# Patient Record
Sex: MALE | Race: WHITE | NOT HISPANIC OR LATINO | Employment: FULL TIME | URBAN - METROPOLITAN AREA
[De-identification: names, ages, dates, MRNs, and addresses within clinical notes are randomized per-mention and may not be internally consistent; named-entity substitution may affect disease eponyms.]

---

## 2017-03-07 ENCOUNTER — HOSPITAL ENCOUNTER (EMERGENCY)
Facility: HOSPITAL | Age: 22
Discharge: HOME | End: 2017-03-07
Attending: EMERGENCY MEDICINE | Admitting: EMERGENCY MEDICINE
Payer: COMMERCIAL

## 2017-03-07 ENCOUNTER — APPOINTMENT (EMERGENCY)
Dept: RADIOLOGY | Facility: HOSPITAL | Age: 22
End: 2017-03-07
Payer: COMMERCIAL

## 2017-03-07 VITALS
DIASTOLIC BLOOD PRESSURE: 60 MMHG | RESPIRATION RATE: 16 BRPM | WEIGHT: 135 LBS | TEMPERATURE: 98 F | SYSTOLIC BLOOD PRESSURE: 130 MMHG | OXYGEN SATURATION: 98 % | HEART RATE: 73 BPM

## 2017-03-07 DIAGNOSIS — S83.90XA KNEE SPRAIN: Primary | ICD-10-CM

## 2017-03-07 PROCEDURE — 73564 X-RAY EXAM KNEE 4 OR MORE: CPT

## 2017-03-07 PROCEDURE — 99283 EMERGENCY DEPT VISIT LOW MDM: CPT

## 2017-03-07 RX ORDER — NAPROXEN 500 MG/1
500 TABLET ORAL 3 TIMES DAILY PRN
Qty: 15 TABLET | Refills: 0 | Status: SHIPPED | OUTPATIENT
Start: 2017-03-07 | End: 2019-07-25

## 2017-03-07 RX ORDER — IBUPROFEN 600 MG/1
600 TABLET ORAL ONCE
Status: COMPLETED | OUTPATIENT
Start: 2017-03-07 | End: 2017-03-07

## 2017-03-07 RX ADMIN — IBUPROFEN 600 MG: 600 TABLET ORAL at 11:44

## 2019-07-22 ENCOUNTER — APPOINTMENT (EMERGENCY)
Dept: RADIOLOGY | Facility: HOSPITAL | Age: 24
End: 2019-07-22
Payer: COMMERCIAL

## 2019-07-22 ENCOUNTER — HOSPITAL ENCOUNTER (EMERGENCY)
Facility: HOSPITAL | Age: 24
Discharge: HOME | End: 2019-07-22
Attending: EMERGENCY MEDICINE | Admitting: EMERGENCY MEDICINE
Payer: COMMERCIAL

## 2019-07-22 VITALS
TEMPERATURE: 99 F | SYSTOLIC BLOOD PRESSURE: 113 MMHG | OXYGEN SATURATION: 98 % | HEIGHT: 70 IN | HEART RATE: 66 BPM | BODY MASS INDEX: 19.33 KG/M2 | WEIGHT: 135 LBS | DIASTOLIC BLOOD PRESSURE: 74 MMHG | RESPIRATION RATE: 16 BRPM

## 2019-07-22 DIAGNOSIS — S67.20XA: Primary | ICD-10-CM

## 2019-07-22 PROCEDURE — 73130 X-RAY EXAM OF HAND: CPT

## 2019-07-22 PROCEDURE — 99283 EMERGENCY DEPT VISIT LOW MDM: CPT | Performed by: EMERGENCY MEDICINE

## 2019-07-22 PROCEDURE — 99283 EMERGENCY DEPT VISIT LOW MDM: CPT

## 2019-07-22 RX ORDER — PREDNISONE 20 MG/1
60 TABLET ORAL ONCE
Status: COMPLETED | OUTPATIENT
Start: 2019-07-22 | End: 2019-07-22

## 2019-07-22 RX ORDER — IBUPROFEN 600 MG/1
600 TABLET ORAL EVERY 6 HOURS PRN
Qty: 30 TABLET | Refills: 0 | Status: SHIPPED | OUTPATIENT
Start: 2019-07-22 | End: 2019-07-25

## 2019-07-22 RX ORDER — PREDNISONE 20 MG/1
60 TABLET ORAL DAILY
Qty: 15 TABLET | Refills: 0 | Status: SHIPPED | OUTPATIENT
Start: 2019-07-22 | End: 2019-07-27

## 2019-07-22 RX ADMIN — PREDNISONE 60 MG: 20 TABLET ORAL at 11:50
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
## 2019-07-25 VITALS
HEART RATE: 61 BPM | BODY MASS INDEX: 18.38 KG/M2 | DIASTOLIC BLOOD PRESSURE: 69 MMHG | SYSTOLIC BLOOD PRESSURE: 109 MMHG | WEIGHT: 128.4 LBS | HEIGHT: 70 IN

## 2019-07-25 DIAGNOSIS — S60.221A CONTUSION OF RIGHT HAND, INITIAL ENCOUNTER: Primary | ICD-10-CM

## 2019-07-25 PROCEDURE — 99203 OFFICE O/P NEW LOW 30 MIN: CPT | Performed by: ORTHOPAEDIC SURGERY

## 2019-07-25 NOTE — LETTER
July 25, 2019     Patient: Jt Farnsworth   YOB: 1995   Date of Visit: 7/25/2019       To Whom it May Concern:    Jt Farnsworth is under my professional care  He was seen in my office on 7/25/2019  He will be off of work until 7/28/2019  He may return to work at that time without restrictions  If you have any questions or concerns, please don't hesitate to call           Sincerely,          Mimi Resendiz MD        CC: No Recipients
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
## 2022-02-07 ENCOUNTER — APPOINTMENT (EMERGENCY)
Dept: CT IMAGING | Facility: HOSPITAL | Age: 27
End: 2022-02-07
Payer: OTHER MISCELLANEOUS

## 2022-02-07 ENCOUNTER — HOSPITAL ENCOUNTER (EMERGENCY)
Facility: HOSPITAL | Age: 27
Discharge: HOME | End: 2022-02-07
Attending: EMERGENCY MEDICINE | Admitting: EMERGENCY MEDICINE
Payer: OTHER MISCELLANEOUS

## 2022-02-07 ENCOUNTER — APPOINTMENT (EMERGENCY)
Dept: RADIOLOGY | Facility: HOSPITAL | Age: 27
End: 2022-02-07
Payer: OTHER MISCELLANEOUS

## 2022-02-07 ENCOUNTER — TELEPHONE (OUTPATIENT)
Dept: OBGYN CLINIC | Facility: HOSPITAL | Age: 27
End: 2022-02-07

## 2022-02-07 VITALS
RESPIRATION RATE: 18 BRPM | TEMPERATURE: 98 F | HEART RATE: 85 BPM | DIASTOLIC BLOOD PRESSURE: 76 MMHG | SYSTOLIC BLOOD PRESSURE: 127 MMHG | OXYGEN SATURATION: 99 %

## 2022-02-07 VITALS
RESPIRATION RATE: 18 BRPM | TEMPERATURE: 98.5 F | DIASTOLIC BLOOD PRESSURE: 84 MMHG | OXYGEN SATURATION: 99 % | SYSTOLIC BLOOD PRESSURE: 134 MMHG | HEART RATE: 72 BPM

## 2022-02-07 DIAGNOSIS — S69.91XA INJURY OF RIGHT WRIST, INITIAL ENCOUNTER: Primary | ICD-10-CM

## 2022-02-07 DIAGNOSIS — S62.143A CLOSED HAMATE FRACTURE: ICD-10-CM

## 2022-02-07 DIAGNOSIS — S62.308A CLOSED FRACTURE OF 5TH METACARPAL: Primary | ICD-10-CM

## 2022-02-07 PROCEDURE — 99284 EMERGENCY DEPT VISIT MOD MDM: CPT | Performed by: PHYSICIAN ASSISTANT

## 2022-02-07 PROCEDURE — G1004 CDSM NDSC: HCPCS

## 2022-02-07 PROCEDURE — 96372 THER/PROPH/DIAG INJ SC/IM: CPT

## 2022-02-07 PROCEDURE — 73110 X-RAY EXAM OF WRIST: CPT

## 2022-02-07 PROCEDURE — 99283 EMERGENCY DEPT VISIT LOW MDM: CPT

## 2022-02-07 PROCEDURE — 29125 APPL SHORT ARM SPLINT STATIC: CPT | Performed by: PHYSICIAN ASSISTANT

## 2022-02-07 PROCEDURE — 73200 CT UPPER EXTREMITY W/O DYE: CPT

## 2022-02-07 RX ORDER — KETOROLAC TROMETHAMINE 30 MG/ML
30 INJECTION, SOLUTION INTRAMUSCULAR; INTRAVENOUS ONCE
Status: COMPLETED | OUTPATIENT
Start: 2022-02-07 | End: 2022-02-07

## 2022-02-07 RX ADMIN — KETOROLAC TROMETHAMINE 30 MG: 30 INJECTION, SOLUTION INTRAMUSCULAR at 13:39
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
## 2022-02-07 NOTE — Clinical Note
Jacquie Maryellen was seen and treated in our emergency department on 2/7/2022  Diagnosis:     Andrew Vallecillo  may return to work on return date  He may return on this date: 02/09/2022         If you have any questions or concerns, please don't hesitate to call        Alyssa Guajardo PA-C    ______________________________           _______________          _______________  Hospital Representative                              Date                                Time

## 2022-02-07 NOTE — Clinical Note
Alexia Karyle Fry accompanied Karen Devika to the emergency department on 2/7/2022  Return date if applicable: 17/07/5480        If you have any questions or concerns, please don't hesitate to call        Juan Gonzales PA-C
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
## 2022-02-07 NOTE — TELEPHONE ENCOUNTER
Spoke to patient's girlfriend  She stated his symptoms are worsening from when he was seen in the ER  Stated he is having n/t to his hand now and can move but cannot feel his thumb  Advised for worsening symptoms he would need to be seen in the ER  We cannot advise further unless/until seen in our office  She is asking for the patient to be scheduled for an office visit for follow up  Please reach out for scheduling

## 2022-02-08 ENCOUNTER — OFFICE VISIT (OUTPATIENT)
Dept: OBGYN CLINIC | Facility: CLINIC | Age: 27
End: 2022-02-08
Payer: OTHER MISCELLANEOUS

## 2022-02-08 VITALS — BODY MASS INDEX: 18.32 KG/M2 | HEIGHT: 70 IN | WEIGHT: 128 LBS

## 2022-02-08 DIAGNOSIS — S62.346A CLOSED NONDISPLACED FRACTURE OF BASE OF FIFTH METACARPAL BONE OF RIGHT HAND, INITIAL ENCOUNTER: Primary | ICD-10-CM

## 2022-02-08 PROCEDURE — 99214 OFFICE O/P EST MOD 30 MIN: CPT | Performed by: PHYSICIAN ASSISTANT

## 2022-02-08 PROCEDURE — 29075 APPL CST ELBW FNGR SHORT ARM: CPT | Performed by: PHYSICIAN ASSISTANT

## 2022-02-08 RX ORDER — IBUPROFEN 600 MG/1
600 TABLET ORAL EVERY 6 HOURS PRN
Qty: 60 TABLET | Refills: 0 | Status: SHIPPED | OUTPATIENT
Start: 2022-02-08 | End: 2022-03-04 | Stop reason: HOSPADM

## 2022-02-08 RX ORDER — ACETAMINOPHEN 500 MG
500 TABLET ORAL EVERY 6 HOURS PRN
Qty: 60 TABLET | Refills: 0 | Status: SHIPPED | OUTPATIENT
Start: 2022-02-08 | End: 2022-03-04 | Stop reason: HOSPADM
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
## 2022-02-08 NOTE — LETTER
February 8, 2022     Patient: Anne Marie Mooney   YOB: 1995   Date of Visit: 2/8/2022       To Whom it May Concern:    Anne Marie Mooney is under my professional care  He was seen in my office on 2/8/2022  He will be on work restrictions with no use of the right upper extremity  He will follow up in 3 weeks for re-evaluation  If you have any questions or concerns, please don't hesitate to call  Sincerely,          Cheyanne Ramey PA-C        CC: No Recipients

## 2022-02-17 ENCOUNTER — TELEPHONE (OUTPATIENT)
Dept: OBGYN CLINIC | Facility: OTHER | Age: 27
End: 2022-02-17

## 2022-02-17 NOTE — TELEPHONE ENCOUNTER
Patient called in , he is having some increasing pain to his arm , he reports it as a throbbing pain thru his hand to bicep and also having issues "respinding:" he said when he tells his fingers to move, they do not move right away?     Patient requested to come in sooner than 03-01    I am not sure if I was allowed to schedule for Marleen Payne so I will task to the hand pool for further assistance     C/b # [phone]    Thank you !!!

## 2022-02-21 ENCOUNTER — HOSPITAL ENCOUNTER (OUTPATIENT)
Dept: RADIOLOGY | Facility: HOSPITAL | Age: 27
Discharge: HOME | End: 2022-02-21

## 2022-02-21 ENCOUNTER — OFFICE VISIT (OUTPATIENT)
Dept: OBGYN CLINIC | Facility: HOSPITAL | Age: 27
End: 2022-02-21
Payer: OTHER MISCELLANEOUS

## 2022-02-21 VITALS
HEIGHT: 70 IN | WEIGHT: 128 LBS | SYSTOLIC BLOOD PRESSURE: 120 MMHG | HEART RATE: 80 BPM | DIASTOLIC BLOOD PRESSURE: 80 MMHG | BODY MASS INDEX: 18.32 KG/M2

## 2022-02-21 DIAGNOSIS — R52 PAIN: ICD-10-CM

## 2022-02-21 DIAGNOSIS — S62.346A CLOSED NONDISPLACED FRACTURE OF BASE OF FIFTH METACARPAL BONE OF RIGHT HAND, INITIAL ENCOUNTER: ICD-10-CM

## 2022-02-21 DIAGNOSIS — R52 PAIN: Primary | ICD-10-CM

## 2022-02-21 PROCEDURE — 99213 OFFICE O/P EST LOW 20 MIN: CPT | Performed by: PHYSICIAN ASSISTANT

## 2022-02-21 PROCEDURE — 73080 X-RAY EXAM OF ELBOW: CPT

## 2022-02-21 RX ORDER — METHOCARBAMOL 500 MG/1
500 TABLET, FILM COATED ORAL 4 TIMES DAILY
Qty: 30 TABLET | Refills: 0 | Status: SHIPPED | OUTPATIENT
Start: 2022-02-21 | End: 2022-03-04 | Stop reason: HOSPADM

## 2022-02-21 NOTE — PROGRESS NOTES
CHIEF COMPLAINT:  Chief Complaint   Patient presents with    Right Hand - Follow-up       SUBJECTIVE:  Karen Fortune is a 27y o  year old male who presents for follow-up regarding nondisplaced fracture at the base of the 5th metacarpal   Patient was placed into an ulnar gutter cast   He states that he has pain just above the cast in the forearm  He also notes some pain into his elbow  He states he has decreased strength with range of motion of his digits  He states he will get occasional spurts of pain that are in his forearm and can become intense at times  PAST MEDICAL HISTORY:  Past Medical History:   Diagnosis Date    Hypospadias     as a baby       PAST SURGICAL HISTORY:  Past Surgical History:   Procedure Laterality Date    HERNIA REPAIR  1998    OTHER SURGICAL HISTORY      coils placed by urology     TONSILLECTOMY  1997       FAMILY HISTORY:  History reviewed  No pertinent family history      SOCIAL HISTORY:  Social History     Tobacco Use    Smoking status: Current Every Day Smoker     Packs/day: 1 00    Smokeless tobacco: Never Used   Vaping Use    Vaping Use: Never used   Substance Use Topics    Alcohol use: Yes     Comment: social    Drug use: No       MEDICATIONS:    Current Outpatient Medications:     acetaminophen (TYLENOL) 500 mg tablet, Take 1 tablet (500 mg total) by mouth every 6 (six) hours as needed for mild pain, Disp: 60 tablet, Rfl: 0    ibuprofen (MOTRIN) 600 mg tablet, Take 1 tablet (600 mg total) by mouth every 6 (six) hours as needed for mild pain, Disp: 60 tablet, Rfl: 0    methocarbamol (ROBAXIN) 500 mg tablet, Take 1 tablet (500 mg total) by mouth 4 (four) times a day, Disp: 30 tablet, Rfl: 0    ALLERGIES:  Allergies   Allergen Reactions    Iodinated Diagnostic Agents        REVIEW OF SYSTEMS:  Review of Systems  ROS:   General: no fever, no chills  HEENT:  No loss of hearing or eyesight problems  Eyes:  No red eyes  Respiratory:  No coughing, shortness of breath or wheezing  Cardiovascular:  No chest pain, no palpitations  GI:  Abdomen soft nontender, denies nausea  Endocrine:  No muscle weakness, no frequent urination, no excessive thirst  Urinary:  No dysuria, no incontinence  Musculoskeletal: see HPI and PE  SKIN:  No skin rash, no dry skin  Neurological:  No headaches, no confusion  Psychiatric:  No suicide thoughts, no anxiety, no depression  Review of all other systems is negative    VITALS:  Vitals:    02/21/22 1338   BP: 120/80   Pulse: 80       LABS:  HgA1c: No results found for: HGBA1C  BMP: No results found for: GLUCOSE, CALCIUM, NA, K, CO2, CL, BUN, CREATININE    _____________________________________________________  PHYSICAL EXAMINATION:  General: well developed and well nourished, alert, oriented times 3 and appears comfortable  Psychiatric: Normal  HEENT: Trachea Midline, No torticollis  Pulmonary: No audible wheezing or respiratory distress   Skin: No masses, erythema, lacerations, fluctation, ulcerations  Neurovascular: Sensation Intact to the Median, Ulnar, Radial Nerve, Motor Intact to the Median, Ulnar, Radial Nerve and Pulses Intact    MUSCULOSKELETAL EXAMINATION:  Right hand  Cast is in appropriate position without evidence of damage or soilage  He is able to actively move the thumb, index and long finger  Sensation is intact to light touch   Mild tenderness to palpation over the radial head  He has full range of motion of the elbow with flexion and extension but notes pain while doing so    ___________________________________________________  STUDIES REVIEWED:  Images obtained today of the Right elbow 3 views demonstrate No acute fractures or dislocations noted      PROCEDURES PERFORMED:  Procedures  No Procedures performed today    _____________________________________________________  ASSESSMENT/PLAN:    Closed nondisplaced fracture base of the 5th metacarpal right hand in cast  · Patient was advised that likely due to spasticity of the muscles he is experiencing pain and discomfort into the forearm and hand  · He was advised that he has decreased range of motion because of the splint particularly at the MCP joints  · He was given a prescription for methocarbamol as a muscle relaxer to help with any spasms of the forearm   · He will follow up in 2 weeks for re-evaluation with Dr Brenda Joseph  For the cast off and new x-rays of the right hand  · He was advised to call the office if he has having any issues before this time  Follow Up:  Return in about 2 weeks (around 3/7/2022) for Dr Brenda Joseph   To Do Next Visit:  Re-evaluation of current issue, cast off and x-rays of right hand          Portions of the record may have been created with voice recognition software  Occasional wrong word or "sound a like" substitutions may have occurred due to the inherent limitations of voice recognition software  Read the chart carefully and recognize, using context, where substitutions have occurred

## 2022-02-28 ENCOUNTER — HOSPITAL ENCOUNTER (EMERGENCY)
Facility: HOSPITAL | Age: 27
End: 2022-03-01
Attending: EMERGENCY MEDICINE

## 2022-02-28 DIAGNOSIS — S62.306D CLOSED NONDISPLACED FRACTURE OF FIFTH METACARPAL BONE OF RIGHT HAND WITH ROUTINE HEALING, UNSPECIFIED PORTION OF METACARPAL, SUBSEQUENT ENCOUNTER: ICD-10-CM

## 2022-02-28 DIAGNOSIS — Z00.8 MEDICAL CLEARANCE FOR PSYCHIATRIC ADMISSION: ICD-10-CM

## 2022-02-28 DIAGNOSIS — R45.851 SUICIDAL IDEATIONS: Primary | ICD-10-CM

## 2022-02-28 LAB
AMPHETAMINES SERPL QL SCN: NEGATIVE
BARBITURATES UR QL: NEGATIVE
BENZODIAZ UR QL: NEGATIVE
COCAINE UR QL: NEGATIVE
FLUAV RNA RESP QL NAA+PROBE: NEGATIVE
FLUBV RNA RESP QL NAA+PROBE: NEGATIVE
METHADONE UR QL: NEGATIVE
OPIATES UR QL SCN: NEGATIVE
OXYCODONE+OXYMORPHONE UR QL SCN: NEGATIVE
PCP UR QL: NEGATIVE
RSV RNA RESP QL NAA+PROBE: NEGATIVE
SARS-COV-2 RNA RESP QL NAA+PROBE: NEGATIVE
THC UR QL: NEGATIVE

## 2022-02-28 PROCEDURE — 99284 EMERGENCY DEPT VISIT MOD MDM: CPT | Performed by: EMERGENCY MEDICINE

## 2022-02-28 PROCEDURE — 99285 EMERGENCY DEPT VISIT HI MDM: CPT

## 2022-02-28 PROCEDURE — 0241U HB NFCT DS VIR RESP RNA 4 TRGT: CPT | Performed by: EMERGENCY MEDICINE

## 2022-02-28 PROCEDURE — 80307 DRUG TEST PRSMV CHEM ANLYZR: CPT | Performed by: EMERGENCY MEDICINE

## 2022-02-28 RX ORDER — NICOTINE 21 MG/24HR
21 PATCH, TRANSDERMAL 24 HOURS TRANSDERMAL ONCE
Status: DISCONTINUED | OUTPATIENT
Start: 2022-02-28 | End: 2022-03-02 | Stop reason: HOSPADM

## 2022-02-28 RX ORDER — NICOTINE 21 MG/24HR
PATCH, TRANSDERMAL 24 HOURS TRANSDERMAL
Status: DISCONTINUED
Start: 2022-02-28 | End: 2022-03-02 | Stop reason: HOSPADM

## 2022-02-28 RX ADMIN — IBUPROFEN 600 MG: 200 TABLET, FILM COATED ORAL at 20:39
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
## 2022-02-28 NOTE — LETTER
Section I - General Information    Name of Patient: Deuce Hager                 : 1995    Medicare #:____________________  Transport Date: 22 (PCS is valid for round trips on this date and for all repetitive trips in the 60-day range as noted below )  Origin: [address] [place] [address]                                                         Destination:[address]______________  Is the pt's stay covered under Medicare Part A (PPS/DRG)     (_) YES  (_) NO  Closest appropriate facility?  (_) YES  (_) NO  If no, why is transport to more distant facility required?________________________  If hosp-hosp transfer, describe services needed at 2nd facility not available at 1st facility? _________________________________  If hospice pt, is this transport related to pt's terminal illness? (_) YES (_) NO Describe____________________________________    Section II - Medical Necessity Questionnaire  Ambulance transportation is medically necessary only if other means of transport are contraindicated or would be potentially harmful to the patient  To meet this requirement, the patient must either be "bed confined" or suffer from a condition such that transport by means other than ambulance is contraindicated by the patient's condition   The following questions must be answered by the medical professional signing below for this form to be valid:    1)  Describe the MEDICAL CONDITION (physical and/or mental) of this patient AT [address] [place] [address] that requires the patient to be transported in an ambulance and why transport by other means is contraindicated by the patient's condition:__________________________________________________________________________________________________    2) Is the patient "bed confined" as defined below?     (_) YES  (_) NO  To be "be confined" the patient must satisfy all three of the following conditions: (1) unable to get up from bed without Assistance; AND (2) unable to ambulate; AND (3) unable to sit in a chair or wheelchair  3) Can this patient safely be transported by car or wheelchair Latrell Cam (i e , seated during transport without a medical attendant or monitoring)?   (_) YES  (_) NO    4) In addition to completing questions 1-3 above, please check any of the following conditions that apply*:  *Note: supporting documentation for any boxes checked must be maintained in the patient's medical records  (_)Contractures   (_)Non-Healed Fractures  (_)Patient is confused (_)Patient is comatose (_)Moderate/severe pain on movement (_)Danger to self/others  (_)IV meds/fluids required (_)Patient is combative(_)Need or possible need for restraints (_)DVT requires elevation of lower extremity  (_)Medical attendant required (_)Requires oxygen-unable to self administer (_)Special handling/isolation/infection control precautions required (_)Unable to tolerate seated position for time needed to transport (_)Hemodynamic monitoring required en route (_)Unable to sit in a chair or wheelchair due to decubitus ulcers or other wounds (_)Cardiac monitoring required en route (_)Morbid obesity requires additional personnel/equipment to safely handle patient (_)Orthopedic device (backboard, halo, pins, traction, brace, wedge, etc,) requiring special handling during transport (_)Other(specify)_______________________________________________    Section III - Signature of Physician or Healthcare Professional    I certify that the above information is accurate based on my evaluation of this patient, and that the medical necessity provisions of 42  40(e)(1) are met, requiring that this patient be transported by ambulance  I understand this information will be used by the Centers for Medicare and Medicaid Services (CMS) to support the determination of medical necessity for ambulance services   I represent that I am the beneficiarys attending physician; or an employee of the beneficiarys attending physician, or the hospital or facility where the beneficiary is being treated and from which the beneficiary is being transported; that I have personal knowledge of the beneficiarys condition at the time of transport; and that I meet all Medicare regulations and applicable State licensure laws for the credential indicated  (_) If this box is checked, I also certify that the patient is physically or mentally incapable of signing the ambulance service's claim and that the institution with which I am affiliated has furnished care, services, or assistance to the patient  My signature below is made on behalf of the patient pursuant to 42 CFR §424 36(b)(4)  In accordance with 42 CFR §424 37, the specific reason(s) that the patient is physically or mentally incapable of signing the claim form is as follows: _________________________________________________________________________________________________________      Signature of Physician* or Healthcare Professional______________________________________________________________  Signature Date 03/01/22 (For scheduled repetitive transports, this form is not valid for transports performed more than 60 days after this date)    Printed Name & Credentials of Physician or Healthcare Professional (MD, DO, RN, etc )________________________________  *Form must be signed by patient's attending physician for scheduled, repetitive transports   For non-repetitive, unscheduled ambulance transports, if unable to obtain the signature of the attending physician, any of the following may sign (choose appropriate option below)  (_) Physician Assistant   (_)  Clinical Nurse Specialist    (_)  Licensed Practical Nurse    (_)    (_)  Nurse Practitioner     (_)  Registered Nurse                (_)                         (_) Discharge Planner

## 2022-02-28 NOTE — Clinical Note
accompanied Jian Diaz to the emergency department on 2/28/2022  Return date if applicable: 62/26/3451    Moni was present in ED with significant other on 2/28    If you have any questions or concerns, please don't hesitate to call        Antoine Renae RN

## 2022-02-28 NOTE — Clinical Note
forest Damon to the emergency department on 2/28/2022  Return date if applicable: 82/81/9481    Alexia was present in ED with significant other on 2/28    If you have any questions or concerns, please don't hesitate to call        Ernestina Olea RN
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
## 2022-02-28 NOTE — LETTER
[address] [facility] [address]  Dept: [phone]                                                                              EMTALA TRANSFER CONSENT    NAME Zan Barthel                                         1995                              MRN [id number]    I have been informed of my rights regarding examination, treatment, and transfer   by Dr Maria Dolores Jarrell,     Benefits: Continuity of care    Risks: Potential for delay in receiving treatment      Consent for Transfer:  I acknowledge that my medical condition has been evaluated and explained to me by the emergency department physician or other qualified medical person and/or my attending physician, who has recommended that I be transferred to the service of  Accepting Physician: Paul Piedra at [address] Name, [address] : [facility], [facility], [address] [place] [address] Road  The above potential benefits of such transfer, the potential risks associated with such transfer, and the probable risks of not being transferred have been explained to me, and I fully understand them  The doctor has explained that, in my case, the benefits of transfer outweigh the risks  I agree to be transferred  I authorize the performance of emergency medical procedures and treatments upon me in both transit and upon arrival at the receiving facility  Additionally, I authorize the release of any and all medical records to the receiving facility and request they be transported with me, if possible  I understand that the safest mode of transportation during a medical emergency is an ambulance and that the Hospital advocates the use of this mode of transport  Risks of traveling to the receiving facility by car, including absence of medical control, life sustaining equipment, such as oxygen, and medical personnel has been explained to me and I fully understand them      ([facility])  Daisha Candelaria  I consent to the stated transfer and to be transported by ambulance/helicopter  [  ]  I consent to the stated transfer, but refuse transportation by ambulance and accept full responsibility for my transportation by car  I understand the risks of non-ambulance transfers and I exonerate the Hospital and its staff from any deterioration in my condition that results from this refusal     X___________________________________________    DATE  22  TIME________  Signature of patient or legally responsible individual signing on patient behalf           RELATIONSHIP TO PATIENT___self______________________                      Provider Certification    NAME Leo Curling                                        [facility] 1995                              MRN [id number]    A medical screening exam was performed on the above named patient  Based on the examination:    Condition Necessitating Transfer The primary encounter diagnosis was Suicidal ideations  Diagnoses of Medical clearance for psychiatric admission and Closed nondisplaced fracture of fifth metacarpal bone of right hand with routine healing, unspecified portion of metacarpal, subsequent encounter were also pertinent to this visit      Patient Condition: The patient has been stabilized such that within reasonable medical probability, no material deterioration of the patient condition or the condition of the unborn child(thomas) is likely to result from the transfer    Reason for Transfer: Level of Care needed not available at this facility    Transfer Requirements: Facility [facility] [address], [facility], [address]   · Space available and qualified personnel available for treatment as acknowledged by [place] [phone]  · Agreed to accept transfer and to provide appropriate medical treatment as acknowledged by       Adela Millan  · Appropriate medical records of the examination and treatment of the patient are provided at the time of transfer   STAFF INITIAL WHEN COMPLETED __IK_____  · Transfer will be performed by qualified personnel from [place]  and appropriate transfer equipment as required, including the use of necessary and appropriate life support measures  Provider Certification: I have examined the patient and explained the following risks and benefits of being transferred/refusing transfer to the patient/family:  The patient is stable for psychiatric transfer because they are medically stable, and is protected from harming him/herself or others during transport      Based on these reasonable risks and benefits to the patient and/or the unborn child(thomas), and based upon the information available at the time of the patients examination, I certify that the medical benefits reasonably to be expected from the provision of appropriate medical treatments at another medical facility outweigh the increasing risks, if any, to the individuals medical condition, and in the case of labor to the unborn child, from effecting the transfer      X____________________________________________ DATE 03/01/22        TIME_______      ORIGINAL - SEND TO MEDICAL RECORDS   COPY - SEND WITH PATIENT DURING TRANSFER

## 2022-03-01 ENCOUNTER — APPOINTMENT (EMERGENCY)
Dept: RADIOLOGY | Facility: HOSPITAL | Age: 27
End: 2022-03-01

## 2022-03-01 VITALS
SYSTOLIC BLOOD PRESSURE: 130 MMHG | TEMPERATURE: 98.1 F | HEART RATE: 74 BPM | OXYGEN SATURATION: 100 % | DIASTOLIC BLOOD PRESSURE: 88 MMHG

## 2022-03-01 PROCEDURE — 99242 OFF/OP CONSLTJ NEW/EST SF 20: CPT | Performed by: PSYCHIATRY & NEUROLOGY

## 2022-03-01 PROCEDURE — 73130 X-RAY EXAM OF HAND: CPT

## 2022-03-01 RX ORDER — LANOLIN ALCOHOL/MO/W.PET/CERES
3 CREAM (GRAM) TOPICAL
Status: CANCELLED | OUTPATIENT
Start: 2022-03-01

## 2022-03-01 RX ORDER — HYDROXYZINE 50 MG/1
25 TABLET, FILM COATED ORAL
Status: CANCELLED | OUTPATIENT
Start: 2022-03-01

## 2022-03-01 RX ORDER — BENZTROPINE MESYLATE 1 MG/ML
1 INJECTION INTRAMUSCULAR; INTRAVENOUS
Status: CANCELLED | OUTPATIENT
Start: 2022-03-01

## 2022-03-01 RX ORDER — ACETAMINOPHEN 325 MG/1
650 TABLET ORAL ONCE
Status: COMPLETED | OUTPATIENT
Start: 2022-03-01 | End: 2022-03-01

## 2022-03-01 RX ORDER — ACETAMINOPHEN 325 MG/1
975 TABLET ORAL EVERY 6 HOURS PRN
Status: CANCELLED | OUTPATIENT
Start: 2022-03-01

## 2022-03-01 RX ORDER — MINERAL OIL AND PETROLATUM 150; 830 MG/G; MG/G
1 OINTMENT OPHTHALMIC
Status: CANCELLED | OUTPATIENT
Start: 2022-03-01

## 2022-03-01 RX ORDER — NICOTINE 21 MG/24HR
1 PATCH, TRANSDERMAL 24 HOURS TRANSDERMAL DAILY
Status: CANCELLED | OUTPATIENT
Start: 2022-03-01

## 2022-03-01 RX ORDER — MAGNESIUM HYDROXIDE/ALUMINUM HYDROXICE/SIMETHICONE 120; 1200; 1200 MG/30ML; MG/30ML; MG/30ML
30 SUSPENSION ORAL EVERY 4 HOURS PRN
Status: CANCELLED | OUTPATIENT
Start: 2022-03-01

## 2022-03-01 RX ORDER — LORAZEPAM 2 MG/ML
2 INJECTION INTRAMUSCULAR EVERY 6 HOURS PRN
Status: CANCELLED | OUTPATIENT
Start: 2022-03-01

## 2022-03-01 RX ORDER — DIPHENHYDRAMINE HYDROCHLORIDE 50 MG/ML
50 INJECTION INTRAMUSCULAR; INTRAVENOUS EVERY 6 HOURS PRN
Status: CANCELLED | OUTPATIENT
Start: 2022-03-01

## 2022-03-01 RX ORDER — POLYETHYLENE GLYCOL 3350 17 G/17G
17 POWDER, FOR SOLUTION ORAL DAILY PRN
Status: CANCELLED | OUTPATIENT
Start: 2022-03-01

## 2022-03-01 RX ORDER — BISACODYL 10 MG
10 SUPPOSITORY, RECTAL RECTAL DAILY PRN
Status: CANCELLED | OUTPATIENT
Start: 2022-03-01

## 2022-03-01 RX ORDER — ACETAMINOPHEN 325 MG/1
650 TABLET ORAL EVERY 4 HOURS PRN
Status: CANCELLED | OUTPATIENT
Start: 2022-03-01

## 2022-03-01 RX ORDER — OLANZAPINE 2.5 MG/1
5 TABLET ORAL
Status: CANCELLED | OUTPATIENT
Start: 2022-03-01

## 2022-03-01 RX ORDER — OLANZAPINE 2.5 MG/1
2.5 TABLET ORAL
Status: CANCELLED | OUTPATIENT
Start: 2022-03-01

## 2022-03-01 RX ORDER — OLANZAPINE 10 MG/1
2.5 INJECTION, POWDER, LYOPHILIZED, FOR SOLUTION INTRAMUSCULAR
Status: CANCELLED | OUTPATIENT
Start: 2022-03-01

## 2022-03-01 RX ORDER — OLANZAPINE 10 MG/1
5 INJECTION, POWDER, LYOPHILIZED, FOR SOLUTION INTRAMUSCULAR
Status: CANCELLED | OUTPATIENT
Start: 2022-03-01

## 2022-03-01 RX ORDER — HYDROXYZINE 50 MG/1
50 TABLET, FILM COATED ORAL
Status: CANCELLED | OUTPATIENT
Start: 2022-03-01

## 2022-03-01 RX ORDER — BENZTROPINE MESYLATE 0.5 MG/1
1 TABLET ORAL
Status: CANCELLED | OUTPATIENT
Start: 2022-03-01

## 2022-03-01 RX ORDER — ACETAMINOPHEN 325 MG/1
650 TABLET ORAL EVERY 6 HOURS PRN
Status: CANCELLED | OUTPATIENT
Start: 2022-03-01

## 2022-03-01 RX ORDER — HYDROXYZINE 50 MG/1
100 TABLET, FILM COATED ORAL
Status: CANCELLED | OUTPATIENT
Start: 2022-03-01

## 2022-03-01 RX ORDER — AMOXICILLIN 250 MG
1 CAPSULE ORAL DAILY PRN
Status: CANCELLED | OUTPATIENT
Start: 2022-03-01

## 2022-03-01 RX ADMIN — Medication 21 MG: at 17:52

## 2022-03-01 RX ADMIN — NICOTINE 21 MG: 21 PATCH, EXTENDED RELEASE TRANSDERMAL at 17:52

## 2022-03-01 RX ADMIN — ACETAMINOPHEN 650 MG: 325 TABLET ORAL at 12:50

## 2022-03-01 NOTE — ED NOTES
Bed Search    [place]:  No beds per Adiel Arambula:  No beds per Wilmar Valdes:  No beds per Delia Edouard  [facility]:  No beds per Sam Munroe  [facility]:  No answer  [place]:  No beds per [facility]:  No beds per Hencara Dauer:  No beds per [facility]:   No beds per Ana Overall  [facility]:  No beds per Marnolana Ranjit:  No beds per [facility]:  No beds per Ed  [facility]:  No beds per Bianka Cunningham

## 2022-03-01 NOTE — ED NOTES
Crisis received verbal consent from Patient to speak to Patien't mother, Cinda Travis, and his girlfriend, Carlos Briseno  Patient was told he was accepted at [facility] and he was agreeable to same  Crisis spoke to Patient's mother, Cinda Travis, and told her he was accepted at [facility]  Crisis provided her with a p/u time and the phone number to the unit as well as the address but stated there is currently no visitation on the behavioral health units
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
## 2022-03-01 NOTE — ED CARE HANDOFF
Emergency Department Sign Out Note        Sign out and transfer of care from Dr Carlos Moss  See Separate Emergency Department note  The patient, Jeannette Tate, was evaluated by the previous provider for SI/HI  Workup Completed:  Cbc, uds, covid, etoh     ED Course / Workup Pending (followup):                                      ED Course as of 03/01/22 0643   Mon Feb 28, 2022   2222 Received in sign out:     Pt signed in 201 for SI/HI  Medically cleared: uds, covid, ETOH done  No issues during last shift         Procedures  MDM        Disposition  Final diagnoses:   Suicidal ideations     Time reflects when diagnosis was documented in both MDM as applicable and the Disposition within this note     Time User Action Codes Description Comment    3/1/2022 12:44 AM Joanna Brown Add [Z60 [id number]] Suicidal ideations       ED Disposition     ED Disposition Condition Date/Time Comment    Transfer to [address] [place] [address] Mar 1, 2022 12:58 AM Jeannette Tate should be transferred out to [facility] and has been medically cleared  MD Documentation      Most Recent Value   Sending MD Maria Ines Adame DO      Follow-up Information    None       Patient's Medications   Discharge Prescriptions    No medications on file     No discharge procedures on file         ED Provider  Electronically Signed by     Jovan Lopez MD  03/01/22 1978
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
## 2022-03-01 NOTE — ED NOTES
Patient is accepted at [facility]  Patient is accepted by Shnaa Barahona PA-C per [facility]  Transportation is arranged with [employer]  Transportation is scheduled for 2345  Patient may go to the floor upon arrival           Nurse report is to be called to [phone] prior to patient transfer      Crisis logged transport request with Thuy at [facility]

## 2022-03-01 NOTE — ED NOTES
[facility] Suicide Risk Assessment deferred, as unable to assess while patient sleeping  Behavioral Health Assessment deferred as patient is sleeping and would benefit from additional rest   Vital signs deferred until patient awake, no signs or symptoms of respiratory distress at this time  Once patient is awake and able to participate, will complete assessments         Rain Amin RN  02/28/22 0154

## 2022-03-01 NOTE — ED NOTES
Dell Suicide Risk Assessment deferred, as unable to assess while patient sleeping  Behavioral Health Assessment deferred as patient is sleeping and would benefit from additional rest   Vital signs deferred until patient awake, no signs or symptoms of respiratory distress at this time  Once patient is awake and able to participate, will complete assessments         Annel Henry RN  03/01/22 9811

## 2022-03-02 ENCOUNTER — TELEPHONE (OUTPATIENT)
Dept: OBGYN CLINIC | Facility: HOSPITAL | Age: 27
End: 2022-03-02

## 2022-03-02 ENCOUNTER — HOSPITAL ENCOUNTER (INPATIENT)
Facility: HOSPITAL | Age: 27
LOS: 2 days | Discharge: HOME | DRG: 755 | End: 2022-03-04
Attending: STUDENT IN AN ORGANIZED HEALTH CARE EDUCATION/TRAINING PROGRAM | Admitting: STUDENT IN AN ORGANIZED HEALTH CARE EDUCATION/TRAINING PROGRAM
Payer: COMMERCIAL

## 2022-03-02 DIAGNOSIS — S62.306D CLOSED NONDISPLACED FRACTURE OF FIFTH METACARPAL BONE OF RIGHT HAND WITH ROUTINE HEALING, UNSPECIFIED PORTION OF METACARPAL, SUBSEQUENT ENCOUNTER: ICD-10-CM

## 2022-03-02 DIAGNOSIS — Z00.8 MEDICAL CLEARANCE FOR PSYCHIATRIC ADMISSION: ICD-10-CM

## 2022-03-02 DIAGNOSIS — F43.22 ADJUSTMENT DISORDER WITH ANXIOUS MOOD: Primary | ICD-10-CM

## 2022-03-02 PROBLEM — Z72.0 TOBACCO USE: Status: ACTIVE | Noted: 2022-03-02

## 2022-03-02 PROBLEM — S62.309A METACARPAL BONE FRACTURE: Status: ACTIVE | Noted: 2022-03-02

## 2022-03-02 PROBLEM — F39 MOOD DISORDER (HCC): Status: ACTIVE | Noted: 2022-03-02

## 2022-03-02 LAB
ALBUMIN SERPL BCP-MCNC: 4.2 G/DL (ref 3.5–5)
ALP SERPL-CCNC: 58 U/L (ref 46–116)
ALT SERPL W P-5'-P-CCNC: 44 U/L (ref 12–78)
ANION GAP SERPL CALCULATED.3IONS-SCNC: 7 MMOL/L (ref 4–13)
AST SERPL W P-5'-P-CCNC: 21 U/L (ref 5–45)
BASOPHILS # BLD AUTO: 0.03 THOUSANDS/ΜL (ref 0–0.1)
BASOPHILS NFR BLD AUTO: 0 % (ref 0–1)
BILIRUB SERPL-MCNC: 0.5 MG/DL (ref 0.2–1)
BUN SERPL-MCNC: 15 MG/DL (ref 5–25)
CALCIUM SERPL-MCNC: 8.8 MG/DL (ref 8.3–10.1)
CHLORIDE SERPL-SCNC: 103 MMOL/L (ref 100–108)
CHOLEST SERPL-MCNC: 212 MG/DL
CO2 SERPL-SCNC: 28 MMOL/L (ref 21–32)
CREAT SERPL-MCNC: 0.89 MG/DL (ref 0.6–1.3)
EOSINOPHIL # BLD AUTO: 0.12 THOUSAND/ΜL (ref 0–0.61)
EOSINOPHIL NFR BLD AUTO: 1 % (ref 0–6)
ERYTHROCYTE [DISTWIDTH] IN BLOOD BY AUTOMATED COUNT: 12.8 % (ref 11.6–15.1)
GFR SERPL CREATININE-BSD FRML MDRD: 116 ML/MIN/1.73SQ M
GLUCOSE P FAST SERPL-MCNC: 84 MG/DL (ref 65–99)
GLUCOSE SERPL-MCNC: 84 MG/DL (ref 65–140)
HCT VFR BLD AUTO: 46 % (ref 36.5–49.3)
HDLC SERPL-MCNC: 49 MG/DL
HGB BLD-MCNC: 15.4 G/DL (ref 12–17)
IMM GRANULOCYTES # BLD AUTO: 0.02 THOUSAND/UL (ref 0–0.2)
IMM GRANULOCYTES NFR BLD AUTO: 0 % (ref 0–2)
LDLC SERPL CALC-MCNC: 145 MG/DL (ref 0–100)
LYMPHOCYTES # BLD AUTO: 2.75 THOUSANDS/ΜL (ref 0.6–4.47)
LYMPHOCYTES NFR BLD AUTO: 31 % (ref 14–44)
MCH RBC QN AUTO: 30 PG (ref 26.8–34.3)
MCHC RBC AUTO-ENTMCNC: 33.5 G/DL (ref 31.4–37.4)
MCV RBC AUTO: 90 FL (ref 82–98)
MONOCYTES # BLD AUTO: 0.62 THOUSAND/ΜL (ref 0.17–1.22)
MONOCYTES NFR BLD AUTO: 7 % (ref 4–12)
NEUTROPHILS # BLD AUTO: 5.29 THOUSANDS/ΜL (ref 1.85–7.62)
NEUTS SEG NFR BLD AUTO: 61 % (ref 43–75)
NONHDLC SERPL-MCNC: 163 MG/DL
NRBC BLD AUTO-RTO: 0 /100 WBCS
PLATELET # BLD AUTO: 274 THOUSANDS/UL (ref 149–390)
PMV BLD AUTO: 9.9 FL (ref 8.9–12.7)
POTASSIUM SERPL-SCNC: 4.1 MMOL/L (ref 3.5–5.3)
PROT SERPL-MCNC: 7.1 G/DL (ref 6.4–8.2)
RBC # BLD AUTO: 5.14 MILLION/UL (ref 3.88–5.62)
RPR SER QL: NORMAL
SODIUM SERPL-SCNC: 138 MMOL/L (ref 136–145)
TRIGL SERPL-MCNC: 92 MG/DL
TSH SERPL DL<=0.05 MIU/L-ACNC: 1.57 UIU/ML (ref 0.36–3.74)
WBC # BLD AUTO: 8.83 THOUSAND/UL (ref 4.31–10.16)

## 2022-03-02 PROCEDURE — 86592 SYPHILIS TEST NON-TREP QUAL: CPT | Performed by: PHYSICIAN ASSISTANT

## 2022-03-02 PROCEDURE — 84443 ASSAY THYROID STIM HORMONE: CPT | Performed by: PHYSICIAN ASSISTANT

## 2022-03-02 PROCEDURE — 99253 IP/OBS CNSLTJ NEW/EST LOW 45: CPT | Performed by: PHYSICIAN ASSISTANT

## 2022-03-02 PROCEDURE — 99222 1ST HOSP IP/OBS MODERATE 55: CPT | Performed by: STUDENT IN AN ORGANIZED HEALTH CARE EDUCATION/TRAINING PROGRAM

## 2022-03-02 PROCEDURE — 85025 COMPLETE CBC W/AUTO DIFF WBC: CPT | Performed by: PHYSICIAN ASSISTANT

## 2022-03-02 PROCEDURE — 83036 HEMOGLOBIN GLYCOSYLATED A1C: CPT | Performed by: PHYSICIAN ASSISTANT

## 2022-03-02 PROCEDURE — 93005 ELECTROCARDIOGRAM TRACING: CPT

## 2022-03-02 PROCEDURE — 80053 COMPREHEN METABOLIC PANEL: CPT | Performed by: PHYSICIAN ASSISTANT

## 2022-03-02 PROCEDURE — 80061 LIPID PANEL: CPT | Performed by: PHYSICIAN ASSISTANT

## 2022-03-02 RX ORDER — AMOXICILLIN 250 MG
1 CAPSULE ORAL DAILY PRN
Status: DISCONTINUED | OUTPATIENT
Start: 2022-03-02 | End: 2022-03-04 | Stop reason: HOSPADM

## 2022-03-02 RX ORDER — MINERAL OIL AND PETROLATUM 150; 830 MG/G; MG/G
1 OINTMENT OPHTHALMIC
Status: DISCONTINUED | OUTPATIENT
Start: 2022-03-02 | End: 2022-03-04 | Stop reason: HOSPADM

## 2022-03-02 RX ORDER — POLYETHYLENE GLYCOL 3350 17 G/17G
17 POWDER, FOR SOLUTION ORAL DAILY PRN
Status: DISCONTINUED | OUTPATIENT
Start: 2022-03-02 | End: 2022-03-04 | Stop reason: HOSPADM

## 2022-03-02 RX ORDER — MAGNESIUM HYDROXIDE/ALUMINUM HYDROXICE/SIMETHICONE 120; 1200; 1200 MG/30ML; MG/30ML; MG/30ML
30 SUSPENSION ORAL EVERY 4 HOURS PRN
Status: DISCONTINUED | OUTPATIENT
Start: 2022-03-02 | End: 2022-03-04 | Stop reason: HOSPADM

## 2022-03-02 RX ORDER — ACETAMINOPHEN 325 MG/1
650 TABLET ORAL EVERY 6 HOURS PRN
Status: DISCONTINUED | OUTPATIENT
Start: 2022-03-02 | End: 2022-03-04 | Stop reason: HOSPADM

## 2022-03-02 RX ORDER — LORAZEPAM 2 MG/ML
2 INJECTION INTRAMUSCULAR EVERY 6 HOURS PRN
Status: DISCONTINUED | OUTPATIENT
Start: 2022-03-02 | End: 2022-03-04 | Stop reason: HOSPADM

## 2022-03-02 RX ORDER — OLANZAPINE 2.5 MG/1
2.5 TABLET ORAL
Status: DISCONTINUED | OUTPATIENT
Start: 2022-03-02 | End: 2022-03-04 | Stop reason: HOSPADM

## 2022-03-02 RX ORDER — NICOTINE 21 MG/24HR
1 PATCH, TRANSDERMAL 24 HOURS TRANSDERMAL DAILY
Status: DISCONTINUED | OUTPATIENT
Start: 2022-03-02 | End: 2022-03-04 | Stop reason: HOSPADM

## 2022-03-02 RX ORDER — ACETAMINOPHEN 325 MG/1
975 TABLET ORAL EVERY 6 HOURS PRN
Status: DISCONTINUED | OUTPATIENT
Start: 2022-03-02 | End: 2022-03-04 | Stop reason: HOSPADM

## 2022-03-02 RX ORDER — BENZTROPINE MESYLATE 1 MG/ML
1 INJECTION INTRAMUSCULAR; INTRAVENOUS
Status: DISCONTINUED | OUTPATIENT
Start: 2022-03-02 | End: 2022-03-04 | Stop reason: HOSPADM

## 2022-03-02 RX ORDER — BISACODYL 10 MG
10 SUPPOSITORY, RECTAL RECTAL DAILY PRN
Status: DISCONTINUED | OUTPATIENT
Start: 2022-03-02 | End: 2022-03-04 | Stop reason: HOSPADM

## 2022-03-02 RX ORDER — OLANZAPINE 10 MG/1
2.5 INJECTION, POWDER, LYOPHILIZED, FOR SOLUTION INTRAMUSCULAR
Status: DISCONTINUED | OUTPATIENT
Start: 2022-03-02 | End: 2022-03-04 | Stop reason: HOSPADM

## 2022-03-02 RX ORDER — OLANZAPINE 5 MG/1
5 TABLET ORAL
Status: DISCONTINUED | OUTPATIENT
Start: 2022-03-02 | End: 2022-03-04 | Stop reason: HOSPADM

## 2022-03-02 RX ORDER — HYDROXYZINE HYDROCHLORIDE 25 MG/1
25 TABLET, FILM COATED ORAL
Status: DISCONTINUED | OUTPATIENT
Start: 2022-03-02 | End: 2022-03-04 | Stop reason: HOSPADM

## 2022-03-02 RX ORDER — HYDROXYZINE 50 MG/1
50 TABLET, FILM COATED ORAL
Status: DISCONTINUED | OUTPATIENT
Start: 2022-03-02 | End: 2022-03-04 | Stop reason: HOSPADM

## 2022-03-02 RX ORDER — LANOLIN ALCOHOL/MO/W.PET/CERES
3 CREAM (GRAM) TOPICAL
Status: DISCONTINUED | OUTPATIENT
Start: 2022-03-02 | End: 2022-03-04 | Stop reason: HOSPADM

## 2022-03-02 RX ORDER — BENZTROPINE MESYLATE 1 MG/1
1 TABLET ORAL
Status: DISCONTINUED | OUTPATIENT
Start: 2022-03-02 | End: 2022-03-04 | Stop reason: HOSPADM

## 2022-03-02 RX ORDER — DIPHENHYDRAMINE HYDROCHLORIDE 50 MG/ML
50 INJECTION INTRAMUSCULAR; INTRAVENOUS EVERY 6 HOURS PRN
Status: DISCONTINUED | OUTPATIENT
Start: 2022-03-02 | End: 2022-03-04 | Stop reason: HOSPADM

## 2022-03-02 RX ORDER — ACETAMINOPHEN 325 MG/1
650 TABLET ORAL EVERY 4 HOURS PRN
Status: DISCONTINUED | OUTPATIENT
Start: 2022-03-02 | End: 2022-03-04 | Stop reason: HOSPADM

## 2022-03-02 RX ORDER — HYDROXYZINE 50 MG/1
100 TABLET, FILM COATED ORAL
Status: DISCONTINUED | OUTPATIENT
Start: 2022-03-02 | End: 2022-03-04 | Stop reason: HOSPADM

## 2022-03-02 RX ORDER — OLANZAPINE 10 MG/1
5 INJECTION, POWDER, LYOPHILIZED, FOR SOLUTION INTRAMUSCULAR
Status: DISCONTINUED | OUTPATIENT
Start: 2022-03-02 | End: 2022-03-04 | Stop reason: HOSPADM

## 2022-03-02 RX ORDER — METHOCARBAMOL 500 MG/1
500 TABLET, FILM COATED ORAL EVERY 6 HOURS PRN
Status: DISCONTINUED | OUTPATIENT
Start: 2022-03-02 | End: 2022-03-04 | Stop reason: HOSPADM

## 2022-03-02 RX ADMIN — NICOTINE 1 PATCH: 14 PATCH, EXTENDED RELEASE TRANSDERMAL at 11:05

## 2022-03-02 RX ADMIN — METHOCARBAMOL 500 MG: 500 TABLET ORAL at 22:00

## 2022-03-02 RX ADMIN — Medication 3 MG: at 22:01

## 2022-03-02 RX ADMIN — ACETAMINOPHEN 975 MG: 325 TABLET, FILM COATED ORAL at 01:43

## 2022-03-02 RX ADMIN — ACETAMINOPHEN 975 MG: 325 TABLET, FILM COATED ORAL at 18:04
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
## 2022-03-02 NOTE — ED NOTES
[facility]ell Suicide Risk Assessment deferred, as unable to assess while patient sleeping  Behavioral Health Assessment deferred as patient is sleeping and would benefit from additional rest   Vital signs deferred until patient awake, no signs or symptoms of respiratory distress at this time  Once patient is awake and able to participate, will complete assessments         Rosanna Arthur RN  03/01/22 7084
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
## 2022-03-02 NOTE — ED NOTES
Keys and phone given to significant other at patient discretion for her to take home prior to patient being transferred this evening        Awilda Bolton RN  03/01/22 2056
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
## 2022-03-02 NOTE — PLAN OF CARE
Patient participated in 2/3 groups for the day    Problem: Ineffective Coping  Goal: Participates in unit activities  Description: Interventions:  - Provide therapeutic environment   - Provide required programming   - Redirect inappropriate behaviors   Outcome: Progressing

## 2022-03-02 NOTE — ASSESSMENT & PLAN NOTE
 Admission labs reviewed, CBC acceptable   CMP, RPR, HbA1c, lipid panel, TSH labs pending   Vitals stable    UDS negative   COVID-19 negative   EKG reveals NSR 73bpm,    Medically stable for continued inpatient psychiatric treatment based on available results   Please contact [facility] with any questions or concerns

## 2022-03-02 NOTE — ED NOTES
3/1/22 @ [phone]: Report called to/care transferred to [facility] [place] [phone]         Susi Montoya RN  03/01/22 9619

## 2022-03-02 NOTE — MALNUTRITION/BMI
This medical record reflects one or more clinical indicators suggestive of malnutrition and/or morbid obesity  Malnutrition Findings:   Adult Malnutrition type: Chronic illness  Adult Degree of Malnutrition: Malnutrition of moderate degree (Pt presents with moderate protein calorie malnutrition as evidenced by <75% po intake > 1 month, 7% wt loss in 1 month (2/8/22 58 1 kg, 3/2/22 54 1 kg), BMI 17 10  Treat with Regular diet and Ensure Enlive BID)  Malnutrition Characteristics: Inadequate energy,Weight loss    BMI Findings:  Adult BMI Classifications: Underweight < 18 5     Body mass index is 17 1 kg/m²  See Nutrition note dated 3/2/22 for additional details  Completed nutrition assessment is viewable in the nutrition documentation

## 2022-03-02 NOTE — TREATMENT TEAM
03/02/22 1230   Activity/Group Checklist   Group Life Skills  (Intro to anxiety, the cycle of anxiety, and coping skills)   Attendance Attended   Attendance Duration (min) 31-45   Interactions Interacted appropriately   Affect/Mood Appropriate   Goals Achieved Able to listen to others; Able to engage in interactions     Patient fully participated in the Life skills group  He shared some of his work throughout and had insightful feedback on his personal experiences with anxiety, what causes it, how his body responds to it, and ways to counteract anxiety  He was positive and appropriate throughout towards peers and staff

## 2022-03-02 NOTE — NURSING NOTE
Pt remains pleasant, calm and cooperative  Denies SI/HI-verbally contracts for safety  Pt states he didn't sleep well overnight so was trying catch up on sleep before meeting with the doctor  Pt currently denies anxiety and reports mild/moderate depression  Pt elevating arm in bed  Denies any questions at this time

## 2022-03-02 NOTE — PROGRESS NOTES
Diagnosis of Adjustment disorder with anxious mood reviewed  Short term goals for decrease in anxiety symptoms, decrease in suicidal thoughts, improvement in self care, sleep improvement, improvement in appetite discussed  All present parties in agreement and treatment plan signed      03/04/22 0814   Team Meeting   Meeting Type Tx Team Meeting   Team Members Present   Team Members Present Physician;Nurse;   Physician Team Member Dr Juan Munroe Team Member [facility] Management Team Member Morales   Patient/Family Present   Patient Present No (pt declined to meet with team)    Patient's Family Present No
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
## 2022-03-02 NOTE — CMS CERTIFICATION NOTE
Recertification: Based upon physical, mental and social evaluations, I certify that inpatient psychiatric services continue to be medically necessary for this patient for a duration of 7 midnights for the treatment of  Adjustment disorder with anxious mood   Available alternative community resources still do not meet the patient's mental health care needs  I further attest that an established written individualized plan of care has been updated and is outlined in the patient's medical records
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
## 2022-03-02 NOTE — ED NOTES
Crisis prepared hospital packet, EMTALA, Medical necessity form, obtained signatures  Copies were made for the record

## 2022-03-02 NOTE — TELEPHONE ENCOUNTER
[place] [employer]    Electronically faxed 2/21 OVN to Jorge Madrigal from [employer]      Fax # [phone]    Claim # [id number]  [address]  [place] [address]

## 2022-03-02 NOTE — TREATMENT PLAN
TREATMENT PLAN REVIEW - [address] 26 y o  1995 male MRN: [id number]    [address] [place] [address] Room / Bed: Major D[facility] 295-39 Encounter: [id number]          Admit Date/Time:  3/2/2022 12:26 AM    Treatment Team: Attending Provider: Tj Ireland MD; Patient Care Technician: Charlotte Hanks; Care Manager: Lashawn Gunter, RN; Security: Narcisa Chris; Patient Care Technician: Thomas Henry; Charge Nurse: Elisha Fabian, RN; Registered Nurse: Vashti George, RN; Charge Nurse: Carla Angulo, RN; Registered Nurse: Rsoa Noel, KEYA; Patient Care Assistant: Racquel Borrego;  : Ino Brar    Diagnosis: Principal Problem:    Adjustment disorder with anxious mood  Active Problems:    Mood disorder ([facility] [address] )    Metacarpal bone fracture    Medical clearance for psychiatric admission    Tobacco use      Patient Strengths/Assets: cooperative, motivated, patient is on a voluntary commitment    Patient Barriers/Limitations: marital/family conflict    Short Term Goals: decrease in anxiety symptoms, decrease in suicidal thoughts, improvement in self care, sleep improvement, improvement in appetite    Long Term Goals: improvement in depression, improvement in anxiety, resolution of depressive symptoms, stabilization of mood, free of suicidal thoughts, no self abusive behavior, adequate self care    Progress Towards Goals: starting psychiatric medications as prescribed    Recommended Treatment: medication management, patient medication education, group therapy, milieu therapy, continued Behavioral Health psychiatric evaluation/assessment process    Treatment Frequency: daily medication monitoring, group and milieu therapy daily, monitoring through interdisciplinary rounds, monitoring through weekly patient care conferences    Expected Discharge Date:  5-7 days    Discharge Plan: referral for outpatient medication management with a psychiatrist, referral for outpatient psychotherapy    Treatment Plan Created/Updated By: Richi Quiroz MD

## 2022-03-02 NOTE — TREATMENT TEAM
03/02/22 1330   Activity/Group Checklist   Group Other (Comment)  ([employer]/ Art Therapy)   Attendance Attended   Attendance Duration (min) 46-60   Interactions Interacted appropriately   Affect/Mood Appropriate   Goals Achieved Able to listen to others; Able to engage in interactions     Goals for group include promotes authentic, spontaneous self- expression, connection and insight  Patient utilized the provided materials to engage in the given directive  He spent most of his creative process focused but engaging in conversations with surrounding peers and staff, to which he was appropriate throughout

## 2022-03-03 PROBLEM — F39 MOOD DISORDER (HCC): Status: RESOLVED | Noted: 2022-03-02 | Resolved: 2022-03-03

## 2022-03-03 PROBLEM — Z00.8 MEDICAL CLEARANCE FOR PSYCHIATRIC ADMISSION: Status: RESOLVED | Noted: 2022-03-02 | Resolved: 2022-03-03

## 2022-03-03 LAB
ATRIAL RATE: 73 BPM
EST. AVERAGE GLUCOSE BLD GHB EST-MCNC: 105 MG/DL
HBA1C MFR BLD: 5.3 %
P AXIS: 84 DEGREES
PR INTERVAL: 160 MS
QRS AXIS: 80 DEGREES
QRSD INTERVAL: 94 MS
QT INTERVAL: 374 MS
QTC INTERVAL: 412 MS
T WAVE AXIS: 60 DEGREES
VENTRICULAR RATE: 73 BPM

## 2022-03-03 PROCEDURE — 93010 ELECTROCARDIOGRAM REPORT: CPT | Performed by: INTERNAL MEDICINE

## 2022-03-03 PROCEDURE — 99232 SBSQ HOSP IP/OBS MODERATE 35: CPT | Performed by: PHYSICIAN ASSISTANT

## 2022-03-03 RX ADMIN — NICOTINE 1 PATCH: 14 PATCH, EXTENDED RELEASE TRANSDERMAL at 08:27

## 2022-03-03 RX ADMIN — METHOCARBAMOL 500 MG: 500 TABLET ORAL at 20:01

## 2022-03-03 RX ADMIN — ACETAMINOPHEN 650 MG: 325 TABLET ORAL at 13:20
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
## 2022-03-03 NOTE — TREATMENT TEAM
03/03/22 1230   Activity/Group Checklist   Group Life Skills  (Music and Mindfulness)   Attendance Attended   Attendance Duration (min) 31-45   Interactions Interacted appropriately   Affect/Mood Appropriate;Calm   Goals Achieved Identified feelings; Able to listen to others; Able to engage in interactions     Patient fully participated in the Life skills group  He shared his work throughout and had insightful feedback on emotions and what each song made him feel and think of  He was positive and appropriate throughout towards peers and staff

## 2022-03-03 NOTE — NURSING NOTE
Received patient resting in bed this afternoon  Patient denies current SI/HI  Patient reports, "I am feeling really good today  I got a great night's sleep last night " Patient reports full attendance and participation in scheduled groups throughout the day  Andrew Vallecillo reports, "The groups have been a big help  I have learned how to know when I'm starting to feel overwhelmed and doing things at that point, so that it doesn't get worse " Active listening and positive encouragement provided  Patient reports having a good conversation with his girlfriend earlier today  Patient states, "She is a really positive support person for me  She has really been helping me put things in perspective " Patient reports that his mood has improved and he feels good after speaking with his   "My girlfriend is going to pick me up tomorrow  I'm excited to be back at home with my girlfriend and our puppy " Staff to continue to monitor for safety and support

## 2022-03-03 NOTE — NURSING NOTE
Patient received Tylenol 650 mg 7/10 fractured wrist pain from outgoing shift with positive results

## 2022-03-03 NOTE — DISCHARGE INSTR - APPOINTMENTS
Makenzie Philip RN, our [employer], will be calling you after your discharge, on the phone number that you provided  She will be available as an additional support, if needed  If you wish to speak with her, you may contact Jayleen Waters at [phone]

## 2022-03-03 NOTE — PROGRESS NOTES
Face to Face Supporting Documentation - Home Health    The encounter with this patient was in whole or in part the primary reason for home health admission.    Date of encounter:   Patient:                    MRN:                       YOB: 2021  Sammy Edmonds  [id number]  1998     Home health to see patient for:  Skilled Nursing care for assessment, interventions & education, Physical Therapy evaluation and treatment and Occupational therapy evaluation and treatment    Skilled need for:  Surgical Aftercare lumbar spinal surgery    Skilled nursing interventions to include:  Comment: needs help with therapies    Homebound status evidenced by:  Need the aid of supportive devices such as crutches, canes, wheelchairs or walkers. Leaving home requires a considerable and taxing effort. There is a normal inability to leave the home.    Community Physician to provide follow up care: Pcp Pt States None     Optional Interventions? No      I certify the face to face encounter for this home health care referral meets the CMS requirements and the encounter/clinical assessment with the patient was, in whole, or in part, for the medical condition(s) listed above, which is the primary reason for home health care. Based on my clinical findings: the service(s) are medically necessary, support the need for home health care, and the homebound criteria are met.  I certify that this patient has had a face to face encounter by myself.  Angel Calloway M.D. - NPI: [id number]     Pt pleasant, cooperative, napped on and off during the day  Reported pain and was given Robaxin as a PRN  Took Melatonin for sleep, no other medications  DC: TBD      03/03/22 6291   Team Meeting   Meeting Type Daily Rounds   Team Members Present   Team Members Present Physician;Nurse;; Other (Discipline and Name)   Physician Team Member Dr Carolyn Dillard / Raysa Flores / Lisandro Sheehan Team Member Luisa Draper / Rashi Moe Management Team Member Salima Shields / Janet Gatica   Other (Discipline and Name) Lita Eaton - Art Therapist   Patient/Family Present   Patient Present No   Patient's Family Present No

## 2022-03-03 NOTE — TREATMENT TEAM
03/03/22 1000   Activity/Group Checklist   Group Community meeting  (Goals)   Attendance Attended   Attendance Duration (min) 16-30   Interactions Interacted appropriately   Affect/Mood Appropriate;Calm   Goals Achieved Identified feelings; Able to listen to others; Able to engage in interactions     Check ins and schedule review was completed at the beginning of the session  Patient fully participated in community meeting  He worked on his goal card and participated in the ice breaker activity  He shared his goals during group discussion  Goal card was collected, to be distributed to wrap up group facilitator, to be reviewed at the end of the day

## 2022-03-03 NOTE — PLAN OF CARE
Problem: SELF HARM/SUICIDALITY  Goal: Will have no self-injury during hospital stay  Description: INTERVENTIONS:  - Q 15 MINUTES: Routine safety checks  - Q WAKING SHIFT & PRN: Assess risk to determine if routine checks are adequate to maintain patient safety  - Encourage patient to participate actively in care by formulating a plan to combat response to suicidal ideation, identify supports and resources  Outcome: Progressing     Problem: DEPRESSION  Goal: Will be euthymic at discharge  Description: INTERVENTIONS:  - Administer medication as ordered  - Provide emotional support via 1:1 interaction with staff  - Encourage involvement in milieu/groups/activities  - Monitor for social isolation  Outcome: Progressing     Problem: ANXIETY  Goal: Will report anxiety at manageable levels  Description: INTERVENTIONS:  - Administer medication as ordered  - Teach and encourage coping skills  - Provide emotional support  - Assess patient/family for anxiety and ability to cope  Outcome: Progressing  Goal: By discharge: Patient will verbalize 2 strategies to deal with anxiety  Description: Interventions:  - Identify any obvious source/trigger to anxiety  - Staff will assist patient in applying identified coping technique/skills  - Encourage attendance of scheduled groups and activities  Outcome: Progressing     Problem: SLEEP DISTURBANCE  Goal: Will exhibit normal sleeping pattern  Description: Interventions:  -  Assess the patients sleep pattern, noting recent changes  - Administer medication as ordered  - Decrease environmental stimuli, including noise, as appropriate during the night  - Encourage the patient to actively participate in unit groups and or exercise during the day to enhance ability to achieve adequate sleep at night  - Assess the patient, in the morning, encouraging a description of sleep experience  Outcome: Progressing     Problem: Nutrition/Hydration-ADULT  Goal: Nutrient/Hydration intake appropriate for improving, restoring or maintaining nutritional needs  Description: Monitor and assess patient's nutrition/hydration status for malnutrition  Collaborate with interdisciplinary team and initiate plan and interventions as ordered  Monitor patient's weight and dietary intake as ordered or per policy  Utilize nutrition screening tool and intervene as necessary  Determine patient's food preferences and provide high-protein, high-caloric foods as appropriate  INTERVENTIONS:  - Monitor oral intake, urinary output, labs, and treatment plans  - Assess nutrition and hydration status and recommend course of action  - Evaluate amount of meals eaten  - Assist patient with eating if necessary   - Allow adequate time for meals  - Recommend/ encourage appropriate diets, oral nutritional supplements, and vitamin/mineral supplements  - Order, calculate, and assess calorie counts as needed  - Recommend, monitor, and adjust tube feedings and TPN/PPN based on assessed needs  - Assess need for intravenous fluids  - Provide specific nutrition/hydration education as appropriate  - Include patient/family/caregiver in decisions related to nutrition  Outcome: Progressing     Problem: Nutrition/Hydration-ADULT  Goal: Nutrient/Hydration intake appropriate for improving, restoring or maintaining nutritional needs  Description: Monitor and assess patient's nutrition/hydration status for malnutrition  Collaborate with interdisciplinary team and initiate plan and interventions as ordered  Monitor patient's weight and dietary intake as ordered or per policy  Utilize nutrition screening tool and intervene as necessary  Determine patient's food preferences and provide high-protein, high-caloric foods as appropriate       INTERVENTIONS:  - Monitor oral intake, urinary output, labs, and treatment plans  - Assess nutrition and hydration status and recommend course of action  - Evaluate amount of meals eaten  - Assist patient with eating if necessary   - Allow adequate time for meals  - Recommend/ encourage appropriate diets, oral nutritional supplements, and vitamin/mineral supplements  - Order, calculate, and assess calorie counts as needed  - Recommend, monitor, and adjust tube feedings and TPN/PPN based on assessed needs  - Assess need for intravenous fluids  - Provide specific nutrition/hydration education as appropriate  - Include patient/family/caregiver in decisions related to nutrition  Outcome: Progressing

## 2022-03-03 NOTE — TREATMENT TEAM
03/03/22 1330   Activity/Group Checklist   Group Other (Comment)  ([employer]/ Art Therapy)   Attendance Attended   Attendance Duration (min) 46-60   Interactions Interacted appropriately   Affect/Mood Appropriate;Calm   Goals Achieved Identified feelings; Able to listen to others; Able to engage in interactions     Goals for group include promotes authentic, spontaneous self- expression, connection and insight  Patient utilized the provided materials to engage in the given directive  He spent most of his creative process focused but engaging in conversations with surrounding peers and staff, to which he was appropriate throughout

## 2022-03-03 NOTE — NURSING NOTE
Pt reports he slept fairly well overnight  C/o some discomfort to right hand however elevating and prn pain medication has been effective  Pt denies SI/HI-verbally contracts for safety  Pt reports feeling as though his mood has improved since admission  States his girlfriend has been a strong support for him  Pt denies any concerns or questions  Remains social with peers and attending all groups throughout the day

## 2022-03-03 NOTE — NURSING NOTE
Pt pleasant and cooperative during interaction  Stated feeling mildly depressed, but manageable  No SI  Stated that he was still having pain in right hand, but had decreased after receiving PRN Tylenol  Pt napping for most of shift

## 2022-03-03 NOTE — NURSING NOTE
Patient received Robaxin 500 mg and Melatonin 3 mg at 2200 with positive results  Patient appears asleep during checks

## 2022-03-03 NOTE — DISCHARGE INSTR - OTHER ORDERS
24/7 Caity Willett;  Call: [address] Free:  [facility]: [phone]    The Myra Powers is for persons from [facility] and [facility]  Phone: [phone]      [address]:  [phone]  *Alcohol Anonymous: 510.213.6921  *[place] [employer] Commission: (572) 265-9627  [address] [facility]  on [address] [facility] ([facility]) [employer]: 716.493.1235/Website: www javier org  *Substance Abuse and [address] [facility]) [employer], which is a confidential, free, 24-hour-a-day, 365-day-a-year, information service for individuals and family members facing mental health and/or substance use disorders  This service provides referrals to local treatment facilities, support groups, and community-based organizations  Callers can also order free publications and other information  Call 3-933.284.6256/Website: www [facility] gov  *[employer] 2-1-1: This is a toll free, confidential, 24-hour-a-day service which connects you to a community  in your area who can help you find services and resources that are available to you locally and provide critical services that can improve and save lives    Call: 211  /Website: https://jessyIORevolutionguillermo preston/ Ok to be seen for consult for positive INOCENTE. Thanks.

## 2022-03-03 NOTE — PLAN OF CARE
Problem: SELF HARM/SUICIDALITY  Goal: Will have no self-injury during hospital stay  Description: INTERVENTIONS:  - Q 15 MINUTES: Routine safety checks  - Q WAKING SHIFT & PRN: Assess risk to determine if routine checks are adequate to maintain patient safety  - Encourage patient to participate actively in care by formulating a plan to combat response to suicidal ideation, identify supports and resources  Outcome: Progressing     Problem: DEPRESSION  Goal: Will be euthymic at discharge  Description: INTERVENTIONS:  - Administer medication as ordered  - Provide emotional support via 1:1 interaction with staff  - Encourage involvement in milieu/groups/activities  - Monitor for social isolation  Outcome: Progressing     Problem: ANXIETY  Goal: Will report anxiety at manageable levels  Description: INTERVENTIONS:  - Administer medication as ordered  - Teach and encourage coping skills  - Provide emotional support  - Assess patient/family for anxiety and ability to cope  Outcome: Progressing  Goal: By discharge: Patient will verbalize 2 strategies to deal with anxiety  Description: Interventions:  - Identify any obvious source/trigger to anxiety  - Staff will assist patient in applying identified coping technique/skills  - Encourage attendance of scheduled groups and activities  Outcome: Progressing     Problem: SLEEP DISTURBANCE  Goal: Will exhibit normal sleeping pattern  Description: Interventions:  -  Assess the patients sleep pattern, noting recent changes  - Administer medication as ordered  - Decrease environmental stimuli, including noise, as appropriate during the night  - Encourage the patient to actively participate in unit groups and or exercise during the day to enhance ability to achieve adequate sleep at night  - Assess the patient, in the morning, encouraging a description of sleep experience  Outcome: Progressing     Problem: Nutrition/Hydration-ADULT  Goal: Nutrient/Hydration intake appropriate for improving, restoring or maintaining nutritional needs  Description: Monitor and assess patient's nutrition/hydration status for malnutrition  Collaborate with interdisciplinary team and initiate plan and interventions as ordered  Monitor patient's weight and dietary intake as ordered or per policy  Utilize nutrition screening tool and intervene as necessary  Determine patient's food preferences and provide high-protein, high-caloric foods as appropriate  INTERVENTIONS:  - Monitor oral intake, urinary output, labs, and treatment plans  - Assess nutrition and hydration status and recommend course of action  - Evaluate amount of meals eaten  - Assist patient with eating if necessary   - Allow adequate time for meals  - Recommend/ encourage appropriate diets, oral nutritional supplements, and vitamin/mineral supplements  - Order, calculate, and assess calorie counts as needed  - Recommend, monitor, and adjust tube feedings and TPN/PPN based on assessed needs  - Assess need for intravenous fluids  - Provide specific nutrition/hydration education as appropriate  - Include patient/family/caregiver in decisions related to nutrition  Outcome: Progressing     Problem: Nutrition/Hydration-ADULT  Goal: Nutrient/Hydration intake appropriate for improving, restoring or maintaining nutritional needs  Description: Monitor and assess patient's nutrition/hydration status for malnutrition  Collaborate with interdisciplinary team and initiate plan and interventions as ordered  Monitor patient's weight and dietary intake as ordered or per policy  Utilize nutrition screening tool and intervene as necessary  Determine patient's food preferences and provide high-protein, high-caloric foods as appropriate       INTERVENTIONS:  - Monitor oral intake, urinary output, labs, and treatment plans  - Assess nutrition and hydration status and recommend course of action  - Evaluate amount of meals eaten  - Assist patient with eating if necessary   - Allow adequate time for meals  - Recommend/ encourage appropriate diets, oral nutritional supplements, and vitamin/mineral supplements  - Order, calculate, and assess calorie counts as needed  - Recommend, monitor, and adjust tube feedings and TPN/PPN based on assessed needs  - Assess need for intravenous fluids  - Provide specific nutrition/hydration education as appropriate  - Include patient/family/caregiver in decisions related to nutrition  Outcome: Progressing     Problem: Ineffective Coping  Goal: Participates in unit activities  Description: Interventions:  - Provide therapeutic environment   - Provide required programming   - Redirect inappropriate behaviors   Outcome: Progressing

## 2022-03-03 NOTE — BH TRANSITION RECORD
Contact Information: If you have any questions, concerns, pended studies, tests and/or procedures, or emergencies regarding your inpatient behavioral health visit  Please contact [address] [place] [address] behavioral health unit [phone] and ask to speak to a , nurse or physician  A contact is available 24 hours/ 7 days a week at this number  Summary of Procedures Performed During your Stay:  Below is a list of major procedures performed during your hospital stay and a summary of results:  - Cardiac Procedures/Studies: ekg  - Major Imaging Studies: XR R hand  Pending Studies (From admission, onward)    None        If studies are pending at discharge, follow up with your PCP and/or referring provider

## 2022-03-03 NOTE — PLAN OF CARE
Chief Complaint   Patient presents with   • Loop Recorder     [employer]       Visit Vitals  /64   Pulse 70   Ht 5' 7\" (1.702 m)   Wt 88.5 kg (195 lb)   BMI 30.54 kg/m²       HISTORY OF PRESENT ILLNESS     HPI  Terrence presents to the clinic for routine 6 month follow-up for hx of Paroxysmal Atrial Fibrillation, antiarrhythmic medication management and interrogation of Med[employer] Implanted Loop recorder.  At his last visit in July, his AF burden was low at 1% with longest episode lasting 8 hrs.  We decided to continue with Flecainide 50 mg BID and Eliquis 5 mg BID.   He has a history of Sinus Pauses and at his last visit, longest Pause was 7 sec while sleeping.  He was advised to continue CPAP.   He notes that he is doing fine.  He notes that he is tolerating his medications well without any issues or any bleeding issues.  He notes that sometimes he stands up too fast he would get lightheaded.  Denies any chest pain, shortness of breath, palpitations, or syncope.        MEDICATIONS AND ALLERGIES     Current Outpatient Medications   Medication Sig   • FLUoxetine (PROzac) 20 MG capsule Take 1 capsule by mouth every morning.   • losartan (COZAAR) 50 MG tablet Take 1 tablet by mouth daily.   • atorvastatin (LIPITOR) 80 MG tablet Take 1 tablet by mouth daily.   • OXcarbazepine (TRILEPTAL) 150 MG tablet Take one-half tablet (75mg) by mouth at bedtime   • FLUoxetine (PROzac) 20 MG capsule Take 1 capsule by mouth daily.   • apixaBAN (Eliquis) 5 MG Tab Take 1 tablet by mouth every 12 hours.   • fluoxetine (PROzac) 40 MG capsule Take 1 capsule by mouth every morning.   • clonazePAM (KlonoPIN) 0.5 MG tablet Take 1 tablet by mouth 2 times daily.   • influenza virus quadrivalent vaccine inactivated, PRESERVATIVE FREE, 0.5 ML injection Inject 0.5 mLs into the muscle 1 time for 1 dose   • aspirin 81 MG tablet Take 1 tablet by mouth daily.   • fluoxetine (PROZAC) 40 MG capsule Take 40 mg by mouth daily.   • Esomeprazole  Patient participated in 3/3 groups for the day    Problem: Ineffective Coping  Goal: Participates in unit activities  Description: Interventions:  - Provide therapeutic environment   - Provide required programming   - Redirect inappropriate behaviors   Outcome: Progressing Magnesium (NEXIUM PO) Take 20 mg by mouth daily.   • clonazepam (KLONOPIN) 0.25 MG TABLET DISPERSIBLE Take by mouth 2 times daily.   • Fluticasone Propionate (FLONASE NA) Spray 1 spray in each nostril daily as needed.    • ibuprofen (MOTRIN) 200 MG tablet Take 400 mg by mouth every 6 hours as needed for Pain.    • flecainide (TAMBOCOR) 50 MG tablet Take 1 tablet by mouth 2 times daily.     No current facility-administered medications for this visit.         ALLERGIES:   Allergen Reactions   • Penicillins Other (See Comments)     Was tested for Pencillins but not sure of a reaction       REVIEW OF SYSTEMS     Review of Systems   Constitutional: Negative for malaise/fatigue.   Cardiovascular: Negative for palpitations and syncope.   Respiratory: Negative for shortness of breath.    Neurological: Positive for light-headedness.   All other systems reviewed and are negative.      PHYSICAL EXAM     Physical Exam  Vitals reviewed.   Constitutional:       General: He is not in acute distress.     Appearance: He is not diaphoretic.   HENT:      Head:      Comments: Wears mask  Musculoskeletal:         General: Normal range of motion.      Comments: Ambulate without assistance   Skin:     Findings: No erythema or rash.      Comments: Loop recorder device incision healed well   Neurological:      Mental Status: He is alert.           Creatinine (mg/dL)   Date Value   07/07/2021 1.09      GPT/ALT (Units/L)   Date Value   07/07/2021 36      GOT/AST (Units/L)   Date Value   07/07/2021 25        ASSESSMENT/PLAN     Medtronic implanted Loop recorder - 2/14/2018  Normal function and healed incision.  Reached RRT on 11/2/21.    1. Continue current programming.  2. Discussed with patient the options (leave it vs replace it vs remove it).  3. At this time will keep it.    AF (atrial fibrillation) Paroxysmal   Echo 5/18/2021: LVEF = 59%, IVS = 1.1 cm, LVPW = 1.0 cm, ARLENE = 46.0 ml/m².  No significant change since the prior study dated  12/12/2017.  Echo 12/12/2017: LVEF 65 %, IVS: 1.4 cm, LVPW: 1.0 cm, LA size: 35 mL  Coronary status: Normal Nuclear Stress Test 3/19/2018.   CHADS-VASc score: 3 (age, HTN, CAD)   Anticoagulation: Eliquis  Antiarrhythmic medication: Flecainide  Procedure: none  KRISTEN: Sleep Study 2018, Moderate Obstructive Sleep Apnea - CPAP    The burden is low at 0.6% with longest episode up to 28 min    1. Discussed with patient the findings and options of management.   2. Continue with same therapy.  3. Follow up in 6 months.     Sinus pause  He has episodes up to 6 seconds while sleeping without CPAP. No further episodes since using the CPAP all the time.  He had pauses up to 9 seconds in the past. Now up to 5 seconds.  Continue current management.  No need for pacemaker at this time.    KRISTEN (obstructive sleep apnea)  Continue with CPAP.    Coronary arteriosclerosis  Stable     Essential hypertension  Controlled.  Continue current management.     High risk medication use - Flecainide  Tolerating with pauses up to 9 seconds while sleeping.    1. Continue with 50 mg PO BID.    Anticoagulant long-term use - Eliquis  Tolerating well without bleeding.   Creatinine 7/7/21  - 1.09  LFT 7/7/21 - normal   Continue with 5 mg twice daily.       On 11/17/2021, Yareli JARAMILLO SCRIBE scribed the services personally performed by Dean Abdalla MD      I have reviewed and edited the visit summary above and attest that it is accurate.

## 2022-03-03 NOTE — NURSING NOTE
At Bedside:  -1 pair ruth nolan pants  -1 long sleeved shirt  -1 pair boxers  -1 pair Under Armor slides  -1 bottle of Pantene shampoo

## 2022-03-03 NOTE — PROGRESS NOTES
Progress Note - Behavioral Health     Zan Barthel 27 y o  male MRN: [id number]   Unit/Bed#: [facility] 125-91 Encounter: [id number]    Behavior over the last 24 hours: improving  Aroldo Dodd is a 26 y/o male with a history of adjustment disorder who presents for psychiatric follow up  Patient is pleasant, calm and cooperative upon approach  Staff reports no issues overnight  He says he is feeling better, states "I'm definitely not as depressed or overwhelmed anymore  I spoke with my girlfriend and she's helping me handle the workman's comp situation " Feeling less anxious  States the therapeutic milieu and group therapy sessions have been beneficial  Not currently taking any psychotropics  Denies SI/HI/AH/VH  Affect is bright      Sleep: normal  Appetite: normal  Medication side effects: No   ROS: intermittent R wrist pain, all other systems are negative    Mental Status Evaluation:    Appearance:  age appropriate, casually dressed, adequate grooming, R wrist in cast   Behavior:  pleasant, cooperative, calm   Speech:  hypertalkative   Mood:  improved, less anxious, less depressed   Affect:  brighter   Thought Process:  organized, goal directed, linear   Associations: circumstantial associations   Thought Content:  no overt delusions   Perceptual Disturbances: no auditory hallucinations, no visual hallucinations, does not appear responding to internal stimuli   Risk Potential: Suicidal ideation - None at present  Homicidal ideation - None at present  Potential for aggression - No   Sensorium:  oriented to person, place and time/date   Memory:  recent and remote memory grossly intact   Consciousness:  alert and awake   Attention/Concentration: attention span and concentration are age appropriate   Insight:  fair   Judgment: fair   Gait/Station: normal gait/station   Motor Activity: no abnormal movements     Vital signs in last 24 hours:    Temp:  [98 °F (36 7 °C)-98 9 °F (37 2 °C)] 98 °F (36 7 °C)  HR:  [79-87] 87  Resp: [16] 16  BP: (115-120)/(64-73) 115/64    Laboratory results: I have personally reviewed all pertinent laboratory/tests results    Most Recent Labs:   Lab Results   Component Value Date    WBC 8 83 03/02/2022    RBC 5 14 03/02/2022    HGB 15 4 03/02/2022    HCT 46 0 03/02/2022     03/02/2022    RDW 12 8 03/02/2022    NEUTROABS 5 29 03/02/2022    SODIUM 138 03/02/2022    K 4 1 03/02/2022     03/02/2022    CO2 28 03/02/2022    BUN 15 03/02/2022    CREATININE 0 89 03/02/2022    GLUC 84 03/02/2022    CALCIUM 8 8 03/02/2022    AST 21 03/02/2022    ALT 44 03/02/2022    ALKPHOS 58 03/02/2022    TP 7 1 03/02/2022    ALB 4 2 03/02/2022    TBILI 0 50 03/02/2022    CHOLESTEROL 212 (H) 03/02/2022    HDL 49 03/02/2022    TRIG 92 03/02/2022    LDLCALC 145 (H) 03/02/2022    [place] 163 03/02/2022    VJC6SCQOABMU 1 570 03/02/2022    RPR Non-Reactive 03/02/2022    HGBA1C 5 3 03/02/2022     03/02/2022       Progress Toward Goals: progressing, working on coping skills, discharge planning    Assessment/Plan   Principal Problem:    Adjustment disorder with anxious mood  Active Problems:    Mood disorder ([address] )    Metacarpal bone fracture    Medical clearance for psychiatric admission    Tobacco use      Recommended Treatment:     Planned medication and treatment changes: All current active medications have been reviewed  Encourage group therapy, milieu therapy and occupational therapy  Behavioral Health checks every 7 minutes    Discharge planning for tomorrow back to girlfriend  No evidence of any suicidality at this time      Current Facility-Administered Medications   Medication Dose Route Frequency Provider Last Rate    acetaminophen  650 mg Oral Q6H PRN Samantha Kincaid PA-C      acetaminophen  650 mg Oral Q4H PRN Samantha Kincaid PA-C      acetaminophen  975 mg Oral Q6H PRN Samantha Kincaid PA-C      aluminum-magnesium hydroxide-simethicone  30 mL Oral Q4H PRN Samantha Kincaid PA-C      artificial tear  1 application Both Eyes J0C PRN Merari Fear, PA-C      benztropine  1 mg Intramuscular Q4H PRN Max 6/day Merari Fear, PA-C      benztropine  1 mg Oral Q4H PRN Max 6/day Merari Fear, PA-C      bisacodyl  10 mg Rectal Daily PRN Merari Fear, PA-C      hydrOXYzine HCL  50 mg Oral Q6H PRN Max 4/day Merari Fear, PA-C      Or    diphenhydrAMINE  50 mg Intramuscular Q6H PRN Merari Fear, PA-C      hydrOXYzine HCL  100 mg Oral Q6H PRN Max 4/day Merari Fear, PA-C      Or    LORazepam  2 mg Intramuscular Q6H PRN Merari Fear, PA-C      hydrOXYzine HCL  25 mg Oral Q6H PRN Max 4/day Merari Fear, PA-C      melatonin  3 mg Oral HS PRN Merari Fear, PA-C      methocarbamol  500 mg Oral Q6H PRN Shelia Rosa MD      nicotine  1 patch Transdermal Daily Merari Fear, PA-C      nicotine polacrilex  2 mg Oral Q2H PRN Merari Fear, PA-C      OLANZapine  5 mg Oral Q4H PRN Max 3/day Merari Fear, PA-C      Or    OLANZapine  2 5 mg Intramuscular Q4H PRN Max 3/day Merari Fear, PA-C      OLANZapine  5 mg Oral Q3H PRN Max 3/day Merari Fear, PA-C      Or    OLANZapine  5 mg Intramuscular Q3H PRN Max 3/day Merari Fear, PA-C      OLANZapine  2 5 mg Oral Q4H PRN Max 6/day Merari Fear, PA-C      polyethylene glycol  17 g Oral Daily PRN Merari Fear, PA-C      senna-docusate sodium  1 tablet Oral Daily PRN Merari Fear, PA-C       Risks / Benefits of Treatment:    Risks, benefits, and possible side effects of medications explained to patient and patient verbalizes understanding and agreement for treatment  Counseling / Coordination of Care:    Patient's progress discussed with staff in treatment team meeting  Medications, treatment progress and treatment plan reviewed with patient      Merari Bowie, DAVONTE 03/03/22

## 2022-03-04 VITALS
WEIGHT: 119.2 LBS | DIASTOLIC BLOOD PRESSURE: 65 MMHG | RESPIRATION RATE: 17 BRPM | SYSTOLIC BLOOD PRESSURE: 117 MMHG | HEART RATE: 77 BPM | TEMPERATURE: 98.3 F | HEIGHT: 70 IN | BODY MASS INDEX: 17.07 KG/M2 | OXYGEN SATURATION: 97 %

## 2022-03-04 PROCEDURE — 99238 HOSP IP/OBS DSCHRG MGMT 30/<: CPT | Performed by: PHYSICIAN ASSISTANT

## 2022-03-04 RX ORDER — METHOCARBAMOL 500 MG/1
500 TABLET, FILM COATED ORAL EVERY 6 HOURS PRN
Qty: 15 TABLET | Refills: 0 | Status: SHIPPED | OUTPATIENT
Start: 2022-03-04

## 2022-03-04 RX ADMIN — NICOTINE 1 PATCH: 14 PATCH, EXTENDED RELEASE TRANSDERMAL at 08:11

## 2022-03-04 NOTE — NURSING NOTE
Pt  Received Robaxin 500 mg at 2002 for muscle spasm  Med effective within the hour  Pt appeared  asleep during rounds throughout the night

## 2022-03-04 NOTE — PROGRESS NOTES
Pt calm, cooperative  Looking forward to dc today  Pt not taking any medications  DC: Today - Girlfriend will  around 11am      03/04/22 5947   Team Meeting   Meeting Type Daily Rounds   Team Members Present   Team Members Present Physician;Nurse;; Other (Discipline and Name)   Physician Team Member Dr Radha Salamanca / Mimi Lanza / Fatimah Common Team Member Viktor Whitaker / Stephen Freeman Management Team Member Enzo Frank / Jb Cano   Other (Discipline and Name) [id number]  27 - Art Therapist   Patient/Family Present   Patient Present No   Patient's Family Present No

## 2022-03-04 NOTE — NURSING NOTE
Pt remains pleasant, calm and cooperative  Denies SI/HI, verbally contracts for safety  Expresses readiness for discharge and interest in starting therapy upon discharge  Pt reports his mood has improved and denies any concerns or questions

## 2022-03-04 NOTE — TREATMENT TEAM
03/04/22 1000   Activity/Group Checklist   Group Community meeting  (Goals)   Attendance Attended   Attendance Duration (min) 16-30   Interactions Interacted appropriately   Affect/Mood Appropriate;Calm   Goals Achieved Identified feelings; Able to listen to others; Able to engage in interactions     Check ins and schedule review was completed at the beginning of the session  Patient fully participated in community meeting  He worked on his goal card and participated in the ice breaker activity  He shared his goals during group discussion  Goal card was collected, to be distributed to wrap up group facilitator, to be reviewed at the end of the day

## 2022-03-04 NOTE — CASE MANAGEMENT
CM met with pt to check in about dc plans for today  Pt reported he is feeling ready to dc home today  Pt reported that his girlfriend will pick him up around 11am      CM spoke to pt about setting him up with [facility] through [address] [place] [address] as he does not currently have insurance  Pt reported that he would be in agreement with that and would be interested in seeing a therapist  Pt is not currently taking any medications so he would not need a Psychiatrist      Pt signed ROSARIO for [facility] and CM informed him that they will contact him after dc to discuss their services

## 2022-03-04 NOTE — CASE MANAGEMENT
CM called [facility] ([phone]) to refer pt for [facility] funded services as he does not have insurance  CM spoke to intake screener, Juan Mota and she gathered pt information to be processed  She reported that pt would then see [address] [facility] on Tuesday March 8, 2022 at [address] to start process for him to be scheduled with a therapist      Juan Mota requested that GARRY fax pt dc summary to [email or web address] [facility] [employer] at fax# [phone]    She also provided Abhijit's email: [email or web address]

## 2022-03-07 ENCOUNTER — OFFICE VISIT (OUTPATIENT)
Dept: OBGYN CLINIC | Facility: HOSPITAL | Age: 27
End: 2022-03-07
Payer: OTHER MISCELLANEOUS

## 2022-03-07 ENCOUNTER — HOSPITAL ENCOUNTER (OUTPATIENT)
Dept: RADIOLOGY | Facility: HOSPITAL | Age: 27
Discharge: HOME | End: 2022-03-07
Attending: SURGERY
Payer: OTHER MISCELLANEOUS

## 2022-03-07 ENCOUNTER — OFFICE VISIT (OUTPATIENT)
Dept: OCCUPATIONAL THERAPY | Facility: HOSPITAL | Age: 27
End: 2022-03-07
Payer: OTHER MISCELLANEOUS

## 2022-03-07 VITALS
SYSTOLIC BLOOD PRESSURE: 112 MMHG | WEIGHT: 129 LBS | RESPIRATION RATE: 17 BRPM | HEART RATE: 76 BPM | HEIGHT: 70 IN | DIASTOLIC BLOOD PRESSURE: 70 MMHG | BODY MASS INDEX: 18.47 KG/M2

## 2022-03-07 DIAGNOSIS — M79.641 RIGHT HAND PAIN: ICD-10-CM

## 2022-03-07 DIAGNOSIS — S62.306D: Primary | ICD-10-CM

## 2022-03-07 DIAGNOSIS — S62.346D CLOSED NONDISPLACED FRACTURE OF BASE OF FIFTH METACARPAL BONE OF RIGHT HAND WITH ROUTINE HEALING, SUBSEQUENT ENCOUNTER: Primary | ICD-10-CM

## 2022-03-07 PROCEDURE — 99243 OFF/OP CNSLTJ NEW/EST LOW 30: CPT | Performed by: SURGERY

## 2022-03-07 PROCEDURE — 73130 X-RAY EXAM OF HAND: CPT

## 2022-03-07 PROCEDURE — L3906 WHO W/O JOINTS CF: HCPCS | Performed by: OCCUPATIONAL THERAPIST

## 2022-03-07 NOTE — LETTER
March 7, 2022     Patient: Jacquie Bojorquez   YOB: 1995   Date of Visit: 3/7/2022       To Whom it May Concern:    Jacquie Bojorquez is under my professional care  He was seen in my office on 3/7/2022  He may continue to work with limitation, no use of the right upper extremity  He will be re-evaluated in 2 weeks time  If you have any questions or concerns, please don't hesitate to call           Sincerely,          Ronald Hernandez MD
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
## 2022-03-07 NOTE — PROGRESS NOTES
Orthosis    Diagnosis:   1  Closed nondisplaced fracture of fifth metacarpal bone of right hand with routine healing, subsequent encounter       Indication: Fracture    Location: Right  wrist  Supplies: Custom Fit Orthotic  Orthosis type: Ulnar Gutter Hand-Forearm based  Wearing Schedule: Remove for hygiene only  Describe Position: Ulnar gutter MP joint free    Precautions: Fracture    Patient or Caregiver expresses understanding of wearing Schedule and Precautions? Yes  Patient or Caregiver able to don/doff orthotic independently? Yes    Written orders provided to patient?  Yes  Orders Obtained: Written  Orders Obtained from: [facility]    Return for evaluation and treatment Yes, he was also provided with HEP for wrist and digit AROM

## 2022-03-08 ENCOUNTER — TELEPHONE (OUTPATIENT)
Dept: OBGYN CLINIC | Facility: MEDICAL CENTER | Age: 27
End: 2022-03-08

## 2022-03-08 NOTE — TELEPHONE ENCOUNTER
Patient sees Dr Nissa Kang at [employer] calling on work notes faxed to [phone]  completed

## 2022-03-10 ENCOUNTER — TELEPHONE (OUTPATIENT)
Dept: OBGYN CLINIC | Facility: HOSPITAL | Age: 27
End: 2022-03-10

## 2022-03-10 NOTE — TELEPHONE ENCOUNTER
Dori Griffin called for patient's number , advised patient can be reached at [phone]   Thank you

## 2022-03-10 NOTE — TELEPHONE ENCOUNTER
Patient sees Dr Soiltario Barth is calling in from [facility] physical therapy wanting to obtain the PT/OT script faxed over to her at [phone], please fax

## 2022-03-22 ENCOUNTER — TELEPHONE (OUTPATIENT)
Dept: OBGYN CLINIC | Facility: MEDICAL CENTER | Age: 27
End: 2022-03-22

## 2022-03-22 ENCOUNTER — TELEPHONE (OUTPATIENT)
Dept: OBGYN CLINIC | Facility: HOSPITAL | Age: 27
End: 2022-03-22

## 2022-03-22 NOTE — TELEPHONE ENCOUNTER
April, Occupational Therapist with [facility], is calling in stating she is very concerned regarding this patient  He is experiencing symptoms of possible complex regional pain syndrome  For example, color changes with temperature (cold or heat) and she has been working on desensitization with him which they noticed when doing towel rubbing, she could see one of his veins getting 3-4 times larger  When he stopped and applied a cold pack, the vein went down  He also reports exquisite pain all the time       cb # [phone] (office number)  Cell # [phone] (if not in office, call April's cell)

## 2022-03-22 NOTE — TELEPHONE ENCOUNTER
Spoke with April, patient seems to be progressing in motion but with a lot of pain and skin color changes with temperature as well as swelling  She stated his pain has been like this from the beginning and does not seem to be getting better   Reassured her we will see him tomorrow in follow up and go from there

## 2022-03-22 NOTE — TELEPHONE ENCOUNTER
Please call OT back and address concerns  Is it just vein prominence/color changes or a "waxy" appearence that has her concerned about CRPS? What is pt's motion like? We are seeing him back tomorrow and may refer to pain management if this is indeed the case

## 2022-03-23 ENCOUNTER — OFFICE VISIT (OUTPATIENT)
Dept: OBGYN CLINIC | Facility: CLINIC | Age: 27
End: 2022-03-23
Payer: OTHER MISCELLANEOUS

## 2022-03-23 ENCOUNTER — APPOINTMENT (OUTPATIENT)
Dept: RADIOLOGY | Facility: AMBULARY SURGERY CENTER | Age: 27
End: 2022-03-23
Attending: SURGERY
Payer: OTHER MISCELLANEOUS

## 2022-03-23 VITALS
BODY MASS INDEX: 18.47 KG/M2 | DIASTOLIC BLOOD PRESSURE: 77 MMHG | HEART RATE: 74 BPM | SYSTOLIC BLOOD PRESSURE: 112 MMHG | HEIGHT: 70 IN | WEIGHT: 129 LBS | RESPIRATION RATE: 17 BRPM

## 2022-03-23 DIAGNOSIS — S62.346D CLOSED NONDISPLACED FRACTURE OF BASE OF FIFTH METACARPAL BONE OF RIGHT HAND WITH ROUTINE HEALING, SUBSEQUENT ENCOUNTER: Primary | ICD-10-CM

## 2022-03-23 DIAGNOSIS — M25.641 JOINT STIFFNESS OF HAND, RIGHT: ICD-10-CM

## 2022-03-23 DIAGNOSIS — S62.346D CLOSED NONDISPLACED FRACTURE OF BASE OF FIFTH METACARPAL BONE OF RIGHT HAND WITH ROUTINE HEALING, SUBSEQUENT ENCOUNTER: ICD-10-CM

## 2022-03-23 PROCEDURE — 99214 OFFICE O/P EST MOD 30 MIN: CPT | Performed by: SURGERY

## 2022-03-23 PROCEDURE — 73130 X-RAY EXAM OF HAND: CPT

## 2022-03-23 RX ORDER — METHYLPREDNISOLONE 4 MG/1
TABLET ORAL
Qty: 1 EACH | Refills: 0 | Status: SHIPPED | OUTPATIENT
Start: 2022-03-23

## 2022-03-23 NOTE — LETTER
March 23, 2022     Patient: Josh Roman   YOB: 1995   Date of Visit: 3/23/2022       To Whom it May Concern:    Josh Roman is under my professional care  He was seen in my office on 3/23/2022  He has no use of the right hand for work for at least 4 weeks until he is seen back in our office  Restrictions will be re-evaluated at that time  If you have any questions or concerns, please don't hesitate to call           Sincerely,          Gina Euceda MD        CC: Josh Llanosel

## 2022-03-23 NOTE — PROGRESS NOTES
ASSESSMENT/PLAN:      27 y o  male with a right 5th metacarpal base fracture, DOI 2/6/22  Xrays reviewed today and demonstrate healed fracture at the [facility]th [facility]  Patient is having issues with severe pain and stiffness as well as intermittent swelling and vascular changes per patient and therapist  We did discuss with his therapist yesterday regarding concern for CRPS however I am not concerned about this at this time  His hand overall appears normal, we discussed intermittent swelling and vascular changes are normal  Patient also has history of vascular issues in the extremities which does pre-dispose him to these issues  I advised him to discontinue his brace and continue therapy  Oral steroids were sent to his pharmacy to start tomorrow  If pain persists will consider pain management referral      The patient verbalized understanding of exam findings and treatment plan  We engaged in the shared decision-making process and treatment options were discussed at length with the patient  Surgical and conservative management discussed today along with risks and benefits  Diagnoses and all orders for this visit:    Closed nondisplaced fracture of base of fifth metacarpal bone of right hand with routine healing, subsequent encounter  -     XR hand 3+ vw right; Future  -     methylPREDNISolone 4 MG tablet therapy pack; Use as directed on package    Joint stiffness of hand, right  -     methylPREDNISolone 4 MG tablet therapy pack; Use as directed on package      Follow Up:  Return in about 4 weeks (around 4/20/2022)        To Do Next Visit:  Re-evaluate current issue, discuss work restrictions     ____________________________________________________________________________________________________________________________________________      CHIEF COMPLAINT:  Chief Complaint   Patient presents with    Right Hand - Pain, Follow-up       SUBJECTIVE:  Elsi Rice is a 27y o  year old RHD male who presents to the office today for follow up of a right 5th metacarpal fracture  Patient has been in a brace since his last visit and working with therapy  He has been having issues with continued pain as well as color changes and vascular swelling of the hand  He is progressing with motion in therapy but is hypersensitive  I have personally reviewed all the relevant PMH, PSH, SH, FH, Medications and allergies  PAST MEDICAL HISTORY:  Past Medical History:   Diagnosis Date    Hypospadias     as a baby       PAST SURGICAL HISTORY:  Past Surgical History:   Procedure Laterality Date    HERNIA REPAIR  1998    OTHER SURGICAL HISTORY      coils placed by urology     TONSILLECTOMY  1997       FAMILY HISTORY:  Family History   Problem Relation Age of Onset    No Known Problems Mother     No Known Problems Father        SOCIAL HISTORY:  Social History     Tobacco Use    Smoking status: Current Every Day Smoker     Packs/day: 1 00    Smokeless tobacco: Never Used   Vaping Use    Vaping Use: Never used   Substance Use Topics    Alcohol use: Yes     Comment: social    Drug use: No       MEDICATIONS:    Current Outpatient Medications:     methocarbamol (ROBAXIN) 500 mg tablet, Take 1 tablet (500 mg total) by mouth every 6 (six) hours as needed for muscle spasms (Patient not taking: Reported on 3/23/2022 ), Disp: 15 tablet, Rfl: 0    methylPREDNISolone 4 MG tablet therapy pack, Use as directed on package, Disp: 1 each, Rfl: 0    ALLERGIES:  Allergies   Allergen Reactions    Iodinated Diagnostic Agents     Penicillins Other (See Comments)     Pt could not remember at this time       REVIEW OF SYSTEMS:  Review of Systems   Constitutional: Negative for chills, fever and unexpected weight change  HENT: Negative for hearing loss, nosebleeds and sore throat  Eyes: Negative for pain, redness and visual disturbance  Respiratory: Negative for cough, shortness of breath and wheezing      Cardiovascular: Negative for chest pain, palpitations and leg swelling  Gastrointestinal: Negative for abdominal pain, nausea and vomiting  Endocrine: Negative for polydipsia and polyuria  Genitourinary: Negative for difficulty urinating and hematuria  Musculoskeletal: Negative for arthralgias, joint swelling and myalgias  Skin: Negative for rash and wound  Neurological: Negative for dizziness, numbness and headaches  Psychiatric/Behavioral: Negative for decreased concentration, dysphoric mood and suicidal ideas  The patient is not nervous/anxious  VITALS:  Vitals:    03/23/22 1050   BP: 112/77   Pulse: 74   Resp: 17       LABS:  HgA1c:   Lab Results   Component Value Date    HGBA1C 5 3 03/02/2022     BMP:   Lab Results   Component Value Date    CALCIUM 8 8 03/02/2022    K 4 1 03/02/2022    CO2 28 03/02/2022     03/02/2022    BUN 15 03/02/2022    CREATININE 0 89 03/02/2022       _____________________________________________________  PHYSICAL EXAMINATION:  General: well developed and well nourished, alert, oriented times 3 and appears comfortable  Psychiatric: Normal  HEENT: Normocephalic, Atraumatic Trachea Midline, No torticollis  Pulmonary: No audible wheezing or respiratory distress   Cardiovascular: No pitting edema, 2+ radial pulse   Abdominal/GI: abdomen non tender, non distended   Skin: No masses, erythema, lacerations, fluctation, ulcerations  Neurovascular: Sensation Intact to the Median, Ulnar, Radial Nerve, Motor Intact to the Median, Ulnar, Radial Nerve and Pulses Intact  Musculoskeletal: Normal, except as noted in detailed exam and in HPI        MUSCULOSKELETAL EXAMINATION:    Right hand:   No erythema, ecchymosis or edema  Full extension and flexion of all digits  Patient reports tenderness at fracture site   Overall normal appearing hand      ___________________________________________________  STUDIES REVIEWED:  I have personally reviewed AP lateral and oblique radiographs of right hand  which demonstrate healed 5th metacarpal base fracture with no interval displacement          PROCEDURES PERFORMED:  Procedures  No Procedures performed today    _____________________________________________________      Scribe Attestation    I,:  Charli Poole PA-C am acting as a scribe while in the presence of the attending physician :       I,:  Elle Gurrola MD personally performed the services described in this documentation    as scribed in my presence :

## 2022-04-21 ENCOUNTER — OFFICE VISIT (OUTPATIENT)
Dept: OBGYN CLINIC | Facility: CLINIC | Age: 27
End: 2022-04-21
Payer: OTHER MISCELLANEOUS

## 2022-04-21 VITALS
HEIGHT: 70 IN | SYSTOLIC BLOOD PRESSURE: 125 MMHG | HEART RATE: 81 BPM | BODY MASS INDEX: 18.47 KG/M2 | DIASTOLIC BLOOD PRESSURE: 76 MMHG | WEIGHT: 129 LBS

## 2022-04-21 DIAGNOSIS — S62.346D CLOSED NONDISPLACED FRACTURE OF BASE OF FIFTH METACARPAL BONE OF RIGHT HAND WITH ROUTINE HEALING, SUBSEQUENT ENCOUNTER: Primary | ICD-10-CM

## 2022-04-21 PROCEDURE — 99213 OFFICE O/P EST LOW 20 MIN: CPT | Performed by: SURGERY

## 2022-04-21 NOTE — LETTER
April 21, 2022     Patient: Alfonzo Lopez  YOB: 1995  Date of Visit: 4/21/2022      To Whom it May Concern:    Alfonzo Lopez is under my professional care  Chencho Stanley was seen in my office on 4/21/2022  Chencho Stanley may work with restrictions  Continue no use of the right hand for 3 more weeks  In 3 weeks time may increase lifting to 2 lbs then increase by 1 lb for 3 more weeks  He will be re-evaluated in 6 weeks time  If you have any questions or concerns, please don't hesitate to call           Sincerely,          Devika Keane MD

## 2022-04-21 NOTE — PROGRESS NOTES
ASSESSMENT/PLAN:      27 y o  male with a right 5th metacarpal base fracture, DOI 2/6/22  Overall Alvina Garcia is doing well, ROM is full at this time  Advised to continue OT for strengthening exercises, updated script was provided  Vascular changes are likely due to underlying vascular issues  Continue to work with no use of the right hand for 3 more weeks  In 3 weeks time may lift up to 2 lbs then increase by 1 lb weekly for 3 weeks, note was provided  Follow up in 6 weeks time for re-evaluation and re-address work restrictions  The patient verbalized understanding of exam findings and treatment plan  We engaged in the shared decision-making process and treatment options were discussed at length with the patient  Surgical and conservative management discussed today along with risks and benefits  Diagnoses and all orders for this visit:    Closed nondisplaced fracture of base of fifth metacarpal bone of right hand with routine healing, subsequent encounter  -     Cancel: Ambulatory Referral to PT/OT Hand Therapy; Future  -     Ambulatory Referral to PT/OT Hand Therapy; Future      Follow Up:  Return in about 6 weeks (around 6/2/2022)  To Do Next Visit:  Re-evaluation, discuss work restrictions     ____________________________________________________________________________________________________________________________________________      CHIEF COMPLAINT:  Chief Complaint   Patient presents with    Right Hand - Pain       SUBJECTIVE:  Alvaro Richardson is a 27y o  year old RHD male who presents to the office today for a follow up regarding a right 5th metacarpal fracture, DOI 2/6/22  He notes intermittent swelling with hot/cold  He notes stiffness to his right hand at times, more so when gripping or with repetitive motions  He feels his right hand is weak, but notes therapy has not started strengthening exercises with him yet  He will take Ibuprofen as needed          I have personally reviewed all the relevant PMH, PSH, SH, FH, Medications and allergies  PAST MEDICAL HISTORY:  Past Medical History:   Diagnosis Date    Hypospadias     as a baby       PAST SURGICAL HISTORY:  Past Surgical History:   Procedure Laterality Date    HERNIA REPAIR  1998    OTHER SURGICAL HISTORY      coils placed by urology     TONSILLECTOMY  1997       FAMILY HISTORY:  Family History   Problem Relation Age of Onset    No Known Problems Mother     No Known Problems Father        SOCIAL HISTORY:  Social History     Tobacco Use    Smoking status: Current Every Day Smoker     Packs/day: 1 00    Smokeless tobacco: Never Used   Vaping Use    Vaping Use: Never used   Substance Use Topics    Alcohol use: Yes     Comment: social    Drug use: No       MEDICATIONS:    Current Outpatient Medications:     methocarbamol (ROBAXIN) 500 mg tablet, Take 1 tablet (500 mg total) by mouth every 6 (six) hours as needed for muscle spasms (Patient not taking: Reported on 3/23/2022 ), Disp: 15 tablet, Rfl: 0    methylPREDNISolone 4 MG tablet therapy pack, Use as directed on package (Patient not taking: Reported on 4/21/2022 ), Disp: 1 each, Rfl: 0    ALLERGIES:  Allergies   Allergen Reactions    Iodinated Diagnostic Agents     Penicillins Other (See Comments)     Pt could not remember at this time       REVIEW OF SYSTEMS:  Review of Systems   Constitutional: Negative for chills, fever and unexpected weight change  HENT: Negative for hearing loss, nosebleeds and sore throat  Eyes: Negative for pain, redness and visual disturbance  Respiratory: Negative for cough, shortness of breath and wheezing  Cardiovascular: Negative for chest pain, palpitations and leg swelling  Gastrointestinal: Negative for abdominal pain, nausea and vomiting  Endocrine: Negative for polydipsia and polyuria  Genitourinary: Negative for difficulty urinating and hematuria  Musculoskeletal: Negative for arthralgias, joint swelling and myalgias     Skin: Negative for rash and wound  Neurological: Negative for dizziness, numbness and headaches  Psychiatric/Behavioral: Negative for decreased concentration, dysphoric mood and suicidal ideas  The patient is not nervous/anxious  VITALS:  Vitals:    04/21/22 1103   BP: 125/76   Pulse: 81       LABS:  HgA1c:   Lab Results   Component Value Date    HGBA1C 5 3 03/02/2022     BMP:   Lab Results   Component Value Date    CALCIUM 8 8 03/02/2022    K 4 1 03/02/2022    CO2 28 03/02/2022     03/02/2022    BUN 15 03/02/2022    CREATININE 0 89 03/02/2022       _____________________________________________________  PHYSICAL EXAMINATION:  General: well developed and well nourished, alert, oriented times 3 and appears comfortable  Psychiatric: Normal  HEENT: Normocephalic, Atraumatic Trachea Midline, No torticollis  Pulmonary: No audible wheezing or respiratory distress   Cardiovascular: No pitting edema, 2+ radial pulse   Abdominal/GI: abdomen non tender, non distended   Skin: No masses, erythema, lacerations, fluctation, ulcerations  Neurovascular: Sensation Intact to the Median, Ulnar, Radial Nerve, Motor Intact to the Median, Ulnar, Radial Nerve and Pulses Intact  Musculoskeletal: Normal, except as noted in detailed exam and in HPI        MUSCULOSKELETAL EXAMINATION:    Right hand:     No erythema, ecchymosis or edema  Full extension of all digits   Full flexion   Non tender to palpation over fracture site   Pain with strong  and twisting motion  Full composite fist     ___________________________________________________  STUDIES REVIEWED:  No new imaging to review           PROCEDURES PERFORMED:  Procedures  No Procedures performed today    _____________________________________________________      Nicola Corral I,:  Bernard Escamilla am acting as a scribe while in the presence of the attending physician :       I,:  Fidel Cartagena MD personally performed the services described in this documentation as scribed in my presence :

## 2022-04-22 ENCOUNTER — TELEPHONE (OUTPATIENT)
Dept: OBGYN CLINIC | Facility: HOSPITAL | Age: 27
End: 2022-04-22

## 2022-04-22 NOTE — TELEPHONE ENCOUNTER
DR Carlie Dover  RE: Joey Rape and work note from 4/21/22  # [phone]     Calvin Martins from [employer] called asking for OVN and work note from 4/21/22  to be faxed to number below      I e-faxed documents to:      Toshia Palafox  FAX# [phone]  TTWBX #:  [id number]

## 2022-05-12 ENCOUNTER — TELEPHONE (OUTPATIENT)
Dept: OBGYN CLINIC | Facility: HOSPITAL | Age: 27
End: 2022-05-12

## 2022-05-12 NOTE — TELEPHONE ENCOUNTER
Markel Seay with [employer] calling to confirm patient is attending occupational therapy with [facility]  Confirmed that patient has an appointment on 5/16

## 2022-05-12 NOTE — TELEPHONE ENCOUNTER
Dr Tamie Valiente from [facility] for r wrist please be faxed to  Fax # [phone] attn Ref # [id number]

## 2022-05-12 NOTE — TELEPHONE ENCOUNTER
Patient has moved and is over the 90 days so they can go anywhere and will not be using WC, Would like to [facility] instead of [address]  He has not had PT since seen by you, given [facility] PT contact number

## 2022-05-16 ENCOUNTER — EVALUATION (OUTPATIENT)
Dept: OCCUPATIONAL THERAPY | Facility: MEDICAL CENTER | Age: 27
End: 2022-05-16
Payer: OTHER MISCELLANEOUS

## 2022-05-16 DIAGNOSIS — S62.346D CLOSED NONDISPLACED FRACTURE OF BASE OF FIFTH METACARPAL BONE OF RIGHT HAND WITH ROUTINE HEALING, SUBSEQUENT ENCOUNTER: Primary | ICD-10-CM

## 2022-05-16 PROCEDURE — 97165 OT EVAL LOW COMPLEX 30 MIN: CPT

## 2022-05-16 PROCEDURE — 97530 THERAPEUTIC ACTIVITIES: CPT

## 2022-05-18 ENCOUNTER — OFFICE VISIT (OUTPATIENT)
Dept: OCCUPATIONAL THERAPY | Facility: MEDICAL CENTER | Age: 27
End: 2022-05-18
Payer: OTHER MISCELLANEOUS

## 2022-05-18 DIAGNOSIS — S62.346D CLOSED NONDISPLACED FRACTURE OF BASE OF FIFTH METACARPAL BONE OF RIGHT HAND WITH ROUTINE HEALING, SUBSEQUENT ENCOUNTER: Primary | ICD-10-CM

## 2022-05-18 PROCEDURE — 97110 THERAPEUTIC EXERCISES: CPT

## 2022-05-18 NOTE — PROGRESS NOTES
Daily Note     Today's date: 2022  Patient name: Alvaro Richardson  : 1995  MRN: [id number]  Referring provider: Hyacinth Perea MD  Dx:   Encounter Diagnosis     ICD-10-CM    1  Closed nondisplaced fracture of base of fifth metacarpal bone of right hand with routine healing, subsequent encounter  T23 135P                  Subjective: "The exercises are going fine  The driving did not bother me as much "    Objective: See treatment diary below     Precautions: Universal    Manuals HEP                        Ther Ex  10 min education   Education on HEP and dx x5 min     AROM wrist flex/ext/RD/UD x10    AROM forearm rotation x10    PROM wrist flex/ext X3 10 sec X 10 therapist facilitated   Theraputty Tan x 1 a day Yellow - Time: 5 min   Towel Scrunches  X 10   Juxtaciser  X 10   PROM pronation/supination  X 10 therapist facilitated   PROM radial/ulnar deviation  X 10 therapist facilitated   Pronation & Supination with #1 disc  X 10 each way   PRE's with #1 wrist flexion & extension  2 x 10   Gripper Setting      Green Flexbar Bends pronation and supination  X 10 each way   Flexbar Bends Vertical Plane - Red   X 5 each way        Modalities     Heat  10-15 min 5 min          Therapist supervised patient with use of modalities during today's treatment session  Skin integrity was assessed and appeared normal following use of modalities  Assessment:  Pt tolerated today's treatment session well  Pt reports good carry over of HEP  Introduced PROM of the R wrist to decrease soft tissue tightness and improve ROM, tolerated with mild discomfort  Progressed to strengthening exercises today  Able to complete PRE's with 1lb  Upgraded putty resistance from tan to yellow  Pt reports pain up to 4/10 during today's session  Will continue to progress as tolerated  Plan:   Focus on improved ROM and strengthening to improve ability to complete daily activites with ease    POC 2022-812022

## 2022-05-20 ENCOUNTER — OFFICE VISIT (OUTPATIENT)
Dept: OCCUPATIONAL THERAPY | Facility: MEDICAL CENTER | Age: 27
End: 2022-05-20
Payer: OTHER MISCELLANEOUS

## 2022-05-20 DIAGNOSIS — S62.346D CLOSED NONDISPLACED FRACTURE OF BASE OF FIFTH METACARPAL BONE OF RIGHT HAND WITH ROUTINE HEALING, SUBSEQUENT ENCOUNTER: Primary | ICD-10-CM

## 2022-05-20 PROCEDURE — 97110 THERAPEUTIC EXERCISES: CPT

## 2022-05-20 NOTE — PROGRESS NOTES
Daily Note     Today's date: 2022  Patient name: Rachel Abdi  : 1995  MRN: [id number]  Referring provider: Yazmin Hall MD  Dx:   Encounter Diagnosis     ICD-10-CM    1  Closed nondisplaced fracture of base of fifth metacarpal bone of right hand with routine healing, subsequent encounter  Q60 988V                Subjective: "I tried picking up my coffee cup this morning and that hurt  I am at a 1/10 pain right now "    Objective: See treatment diary below     Precautions: Universal    Manuals HEP                        Ther Ex     Education on HEP and dx x5 min     AROM wrist flex/ext/RD/UD x10    AROM forearm rotation x10    PROM wrist flex/ext X3 10 sec X 10 therapist facilitated   Theraputty Tan x 1 a day Yellow - Time: 5 min   Towel Scrunches  X 10   Juxtaciser  X 10   PROM pronation/supination  X 10 therapist facilitated   PROM radial/ulnar deviation  X 10 therapist facilitated   Pronation & Supination with #2 disc  X 10 each way   PRE's with #2 wrist flexion & extension, radial deviation   2 x 10   Gripper Setting #3  X 10   Green Flexbar Bends pronation and supination  X 10 each way   Flexbar Bends Vertical Plane - Red   X 5 each way   Digit Extension (Red)  X 15        Modalities     Heat  10-15 min 5 min          Therapist supervised patient with use of modalities during today's treatment session  Skin integrity was assessed and appeared normal following use of modalities  Assessment:  Pt tolerated today's treatment session well  Pt reports good carry over of HEP  Pt exhibited soft tissue tightness with PROM of wrist today  Progressed with strengthening today, tolerated well with mild discomfort due to stiffness  Increased motion following TE and TA today  Will continue to progress as tolerated  Plan:   Focus on improved ROM and strengthening to improve ability to complete daily activites with ease    POC 2022-812022

## 2022-05-23 ENCOUNTER — OFFICE VISIT (OUTPATIENT)
Dept: OCCUPATIONAL THERAPY | Facility: MEDICAL CENTER | Age: 27
End: 2022-05-23
Payer: OTHER MISCELLANEOUS

## 2022-05-23 DIAGNOSIS — S62.346D CLOSED NONDISPLACED FRACTURE OF BASE OF FIFTH METACARPAL BONE OF RIGHT HAND WITH ROUTINE HEALING, SUBSEQUENT ENCOUNTER: Primary | ICD-10-CM

## 2022-05-23 PROCEDURE — 97110 THERAPEUTIC EXERCISES: CPT

## 2022-05-23 NOTE — PROGRESS NOTES
Daily Note     Today's date: 2022  Patient name: Wendy Nielsen  : 1995  MRN: [id number]  Referring provider: Myra Morataya MD  Dx:   Encounter Diagnosis     ICD-10-CM    1  Closed nondisplaced fracture of base of fifth metacarpal bone of right hand with routine healing, subsequent encounter  V98 037C                Subjective: "I almost dropped my food at the carnival this weekend  It is just weak "    Objective: See treatment diary below     Precautions: Universal    Manuals HEP                        Ther Ex     Education on HEP and dx x5 min     AROM wrist flex/ext/RD/UD x10    AROM forearm rotation x10    PROM wrist flex/ext X3 10 sec X 10 therapist facilitated   Theraputty Tan x 1 a day Yellow - Time: 5 min   Towel Scrunches  X 10   Juxtaciser  X 10   PROM pronation/supination  X 10 therapist facilitated   PROM radial/ulnar deviation  X 10 therapist facilitated   Pronation & Supination with #2 disc  X 10 each way   PRE's with #4 wrist flexion & extension, radial deviation   2 x 10   Gripper Setting #3  X 10   Green Flexbar Bends pronation and supination  X 10 each way   Flexbar Bends Vertical Plane - Red   X 5 each way   Digit Extension (Red)  X 15        Modalities     Heat  10-15 min 5 min          Therapist supervised patient with use of modalities during today's treatment session  Skin integrity was assessed and appeared normal following use of modalities  Assessment:  Pt tolerated today's treatment session well  Pt continues to progress with strengthening exercises, tolerated well with decreased stiffness  Increased motion following PROM, TE and TA today  Encouraged continued completion of HEP for return to work  Pt has a follow up with referring provider on   Will continue to progress as tolerated  Plan:   Focus on improved ROM and strengthening to improve ability to complete daily activites with ease    POC 2022-812022

## 2022-05-25 ENCOUNTER — OFFICE VISIT (OUTPATIENT)
Dept: OCCUPATIONAL THERAPY | Facility: MEDICAL CENTER | Age: 27
End: 2022-05-25
Payer: OTHER MISCELLANEOUS

## 2022-05-25 DIAGNOSIS — S62.346D CLOSED NONDISPLACED FRACTURE OF BASE OF FIFTH METACARPAL BONE OF RIGHT HAND WITH ROUTINE HEALING, SUBSEQUENT ENCOUNTER: Primary | ICD-10-CM

## 2022-05-25 PROCEDURE — 97110 THERAPEUTIC EXERCISES: CPT

## 2022-05-25 NOTE — PROGRESS NOTES
Daily Note     Today's date: 2022  Patient name: Sherri Nichole  : 1995  MRN: [id number]  Referring provider: Benjamin Estes MD  Dx:   Encounter Diagnosis     ICD-10-CM    1  Closed nondisplaced fracture of base of fifth metacarpal bone of right hand with routine healing, subsequent encounter  E47 164G                Subjective: "I was pushing up off of a chair and I hurt my hand  It swelled a little bit "    Objective: See treatment diary below     Precautions: Universal    Manuals HEP                        Ther Ex     Education on HEP and dx x5 min     AROM wrist flex/ext/RD/UD x10    AROM forearm rotation x10    PROM wrist flex/ext X3 10 sec X 10 therapist facilitated   Theraputty Tan x 1 a day Yellow - Time: 5 min   Towel Scrunches  X 10   Juxtaciser  X 10   PROM pronation/supination  X 10 therapist facilitated   PROM radial/ulnar deviation  X 10 therapist facilitated   Pronation & Supination with #2 disc  X 10 each way   PRE's with #4 wrist flexion & extension, radial deviation   1 x 10   Gripper Setting #3  X 10   Green Flexbar Bends pronation and supination  X 10 each way   Flexbar Bends Vertical Plane - Red   X 5 each way   Digit Extension (Green)  X 15        Modalities     Heat  10-15 min 5 min          Therapist supervised patient with use of modalities during today's treatment session  Skin integrity was assessed and appeared normal following use of modalities  Assessment:  Pt tolerated today's treatment session well  Pt reports increased pain when compared to previous treatment session  Reports that he was weight bearing on his R hand while pushing up for a chair, pain provoked  Continued with strengthening therapeutic exercises today, no progression on this date due to pain reported  Pt has a follow up with orthopedic on   Will continue to progress as tolerated  Plan:   Focus on improved ROM and strengthening to improve ability to complete daily activites with ease  POC 5/16/2022-816/2022

## 2022-05-27 ENCOUNTER — OFFICE VISIT (OUTPATIENT)
Dept: OCCUPATIONAL THERAPY | Facility: MEDICAL CENTER | Age: 27
End: 2022-05-27
Payer: OTHER MISCELLANEOUS

## 2022-05-27 DIAGNOSIS — S62.346D CLOSED NONDISPLACED FRACTURE OF BASE OF FIFTH METACARPAL BONE OF RIGHT HAND WITH ROUTINE HEALING, SUBSEQUENT ENCOUNTER: Primary | ICD-10-CM

## 2022-05-27 PROCEDURE — 97110 THERAPEUTIC EXERCISES: CPT

## 2022-05-27 NOTE — PROGRESS NOTES
Daily Note     Today's date: 2022  Patient name: Aniceto Lawton  : 1995  MRN: [id number]  Referring provider: Aurora Dent MD  Dx:   Encounter Diagnosis     ICD-10-CM    1  Closed nondisplaced fracture of base of fifth metacarpal bone of right hand with routine healing, subsequent encounter  E20 209X                Subjective: No significant reported changes on this date  Objective: See treatment diary below     Precautions: Universal    Manuals HEP                        Ther Ex     Education on HEP and dx x5 min     AROM wrist flex/ext/RD/UD x10    AROM forearm rotation x10    PROM wrist flex/ext X3 10 sec X 10 therapist facilitated   Padma Cici x 1 a day Pink - Time: 5 min   Towel Scrunches  X 10   Juxtaciser  X 10   PROM pronation/supination  X 10 therapist facilitated   PROM radial/ulnar deviation  X 10 therapist facilitated   Pronation & Supination with #2 disc  X 10 each way   PRE's with #4 wrist flexion & extension, radial deviation   1 x 10   Gripper Setting #4  X 10   Green Flexbar Bends pronation and supination  X 10 each way   Flexbar Bends Vertical Plane - Red   X 5 each way   Digit Extension (Green)  X 15        Modalities     Heat  10-15 min 5 min          Therapist supervised patient with use of modalities during today's treatment session  Skin integrity was assessed and appeared normal following use of modalities  Assessment:  Pt tolerated today's treatment session well  Pt reports increased stiffness when compared to previous treatment session with pain along D5 metcarpal  Slight progression with strengthening today, tolerated well  Provided with increased resistance of theraputty (yellow) for HEP  Pt has a follow up with orthopedic on   Will continue to progress as tolerated  Plan:   Focus on improved ROM and strengthening to improve ability to complete daily activites with ease    POC 2022-812022

## 2022-05-31 ENCOUNTER — OFFICE VISIT (OUTPATIENT)
Dept: OCCUPATIONAL THERAPY | Facility: MEDICAL CENTER | Age: 27
End: 2022-05-31
Payer: OTHER MISCELLANEOUS

## 2022-05-31 DIAGNOSIS — S62.346D CLOSED NONDISPLACED FRACTURE OF BASE OF FIFTH METACARPAL BONE OF RIGHT HAND WITH ROUTINE HEALING, SUBSEQUENT ENCOUNTER: Primary | ICD-10-CM

## 2022-05-31 PROCEDURE — 97110 THERAPEUTIC EXERCISES: CPT

## 2022-05-31 NOTE — PROGRESS NOTES
Daily Note     Today's date: 2022  Patient name: Shantal Ibarra  : 1995  MRN: [id number]  Referring provider: Brittni Alexandre MD  Dx:   Encounter Diagnosis     ICD-10-CM    1  Closed nondisplaced fracture of base of fifth metacarpal bone of right hand with routine healing, subsequent encounter  C88 552U                Subjective: "It is better, but still not great "    Objective: See treatment diary below     Precautions: Universal    Manuals HEP                        Ther Ex     Education on HEP and dx x5 min     AROM wrist flex/ext/RD/UD x10    AROM forearm rotation x10    PROM wrist flex/ext X3 10 sec X 10 therapist facilitated   Earlyne Blanks x 1 a day Pink - Time: 5 min   Towel Scrunches  X 10   Juxtaciser  X 10   PROM pronation/supination  X 10 therapist facilitated   PROM radial/ulnar deviation  X 10 therapist facilitated   Pronation & Supination with #2 disc  X 10 each way   PRE's with #4 wrist flexion & extension, radial deviation   1 x 10   Gripper Setting #5  X 10   Green Flexbar Bends pronation and supination  X 10 each way   Flexbar Bends Vertical Plane - Green  X 5 each way   Digit Extension (Green)  X 15        Modalities     Heat  10-15 min 5 min          Therapist supervised patient with use of modalities during today's treatment session  Skin integrity was assessed and appeared normal following use of modalities  Assessment:  Pt tolerated today's treatment session well  Pt reports stiffness upon treatment session today  Continues to demonstrate decreased strength when compared to LUE, unaffected side  Progressed pt with strengthening therapeutic exercises as tolerated today  Pt has a follow up with orthopedic on   Will continue to progress as tolerated  Plan:   Focus on improved ROM and strengthening to improve ability to complete daily activites with ease    POC 2022-812022

## 2022-06-02 ENCOUNTER — OFFICE VISIT (OUTPATIENT)
Dept: OCCUPATIONAL THERAPY | Facility: MEDICAL CENTER | Age: 27
End: 2022-06-02
Payer: OTHER MISCELLANEOUS

## 2022-06-02 ENCOUNTER — OFFICE VISIT (OUTPATIENT)
Dept: OBGYN CLINIC | Facility: CLINIC | Age: 27
End: 2022-06-02
Payer: OTHER MISCELLANEOUS

## 2022-06-02 VITALS
DIASTOLIC BLOOD PRESSURE: 76 MMHG | BODY MASS INDEX: 18.12 KG/M2 | HEART RATE: 69 BPM | WEIGHT: 126.6 LBS | SYSTOLIC BLOOD PRESSURE: 126 MMHG | HEIGHT: 70 IN

## 2022-06-02 DIAGNOSIS — S62.346D CLOSED NONDISPLACED FRACTURE OF BASE OF FIFTH METACARPAL BONE OF RIGHT HAND WITH ROUTINE HEALING, SUBSEQUENT ENCOUNTER: Primary | ICD-10-CM

## 2022-06-02 PROCEDURE — 99213 OFFICE O/P EST LOW 20 MIN: CPT | Performed by: SURGERY

## 2022-06-02 PROCEDURE — 97110 THERAPEUTIC EXERCISES: CPT

## 2022-06-02 RX ORDER — IBUPROFEN 600 MG/1
TABLET ORAL EVERY 6 HOURS PRN
COMMUNITY

## 2022-06-02 NOTE — PROGRESS NOTES
ASSESSMENT/PLAN:      23-year-old male with right 5th metacarpal base fracture, date of injury 02/06/2022  Patient is describing vague wrist pain and snapping of his thumb which I feel is unrelated to his fifth metacarpal base fracture  We discussed that he can continue to work with hand therapy to advance his weight lifting  May return to work with his previously noted progression of increasing weight over the next 2 weeks  Anticipated return to work 07/05/2022  I will see him back in 4 weeks for re-evaluation  The patient verbalized understanding of exam findings and treatment plan  We engaged in the shared decision-making process and treatment options were discussed at length with the patient  Surgical and conservative management discussed today along with risks and benefits  Diagnoses and all orders for this visit:    Closed nondisplaced fracture of base of fifth metacarpal bone of right hand with routine healing, subsequent encounter    Other orders  -     ibuprofen (MOTRIN) 600 mg tablet; Take by mouth every 6 (six) hours as needed for mild pain        Follow Up:  Return in about 4 weeks (around 6/30/2022)  To Do Next Visit:  Re-evaluation of current issue    ____________________________________________________________________________________________________________________________________________      CHIEF COMPLAINT:  Chief Complaint   Patient presents with    Right Wrist - Follow-up       SUBJECTIVE:  Baldomero Kerr is a 27y o  year old RHD male who presents the office today for a follow-up evaluation of his right 5th metacarpal base fracture sustained in February of 2022  This is a continuation of a worker's compensation case  Patient notes no pain today at the fracture site  He has been having nonspecific wrist pain since he started hand therapy  Patient does is worse with increased activities  He also notes a some snapping sensation with circumduction of the thumb    He was scheduled to go back to work at [employer] duty capacity with this was not available to him  He has been working on increasing his weight with hand therapy and reports he has been able to get up to 5 lb  Denies any new injury or trauma  I have personally reviewed all the relevant PMH, PSH, SH, FH, Medications and allergies  PAST MEDICAL HISTORY:  Past Medical History:   Diagnosis Date    Hypospadias     as a baby       PAST SURGICAL HISTORY:  Past Surgical History:   Procedure Laterality Date    HERNIA REPAIR  1998    OTHER SURGICAL HISTORY      coils placed by urology     TONSILLECTOMY  1997       FAMILY HISTORY:  Family History   Problem Relation Age of Onset    No Known Problems Mother     No Known Problems Father        SOCIAL HISTORY:  Social History     Tobacco Use    Smoking status: Current Every Day Smoker     Packs/day: 1 00    Smokeless tobacco: Never Used   Vaping Use    Vaping Use: Never used   Substance Use Topics    Alcohol use: Yes     Comment: social    Drug use: No       MEDICATIONS:    Current Outpatient Medications:     ibuprofen (MOTRIN) 600 mg tablet, Take by mouth every 6 (six) hours as needed for mild pain, Disp: , Rfl:     methocarbamol (ROBAXIN) 500 mg tablet, Take 1 tablet (500 mg total) by mouth every 6 (six) hours as needed for muscle spasms (Patient not taking: No sig reported), Disp: 15 tablet, Rfl: 0    methylPREDNISolone 4 MG tablet therapy pack, Use as directed on package (Patient not taking: No sig reported), Disp: 1 each, Rfl: 0    ALLERGIES:  Allergies   Allergen Reactions    Iodinated Diagnostic Agents     Penicillins Other (See Comments)     Pt could not remember at this time       REVIEW OF SYSTEMS:  Review of Systems   Constitutional: Negative for chills, fever and unexpected weight change  HENT: Negative for hearing loss, nosebleeds and sore throat  Eyes: Negative for pain, redness and visual disturbance     Respiratory: Negative for cough, shortness of breath and wheezing  Cardiovascular: Negative for chest pain, palpitations and leg swelling  Gastrointestinal: Negative for abdominal pain, nausea and vomiting  Endocrine: Negative for polydipsia and polyuria  Genitourinary: Negative for dysuria and hematuria  Musculoskeletal: Positive for arthralgias  Negative for joint swelling  Skin: Negative for rash and wound  Neurological: Negative for dizziness, numbness and headaches  Psychiatric/Behavioral: Negative for agitation, decreased concentration and suicidal ideas  VITALS:  Vitals:    06/02/22 0954   BP: 126/76   Pulse: 69       LABS:  HgA1c:   Lab Results   Component Value Date    HGBA1C 5 3 03/02/2022     BMP:   Lab Results   Component Value Date    CALCIUM 8 8 03/02/2022    K 4 1 03/02/2022    CO2 28 03/02/2022     03/02/2022    BUN 15 03/02/2022    CREATININE 0 89 03/02/2022       _____________________________________________________  PHYSICAL EXAMINATION:  General: well developed and well nourished, alert, oriented times 3 and appears comfortable  Psychiatric: Normal  HEENT: Normocephalic, Atraumatic Trachea Midline, No torticollis  Pulmonary: No audible wheezing or respiratory distress   Cardiovascular: No pitting edema, 2+ radial pulse   Abdominal/GI: abdomen non tender, non distended   Skin: No masses, erythema, lacerations, fluctation, ulcerations  Neurovascular: Sensation Intact to the Median, Ulnar, Radial Nerve, Motor Intact to the Median, Ulnar, Radial Nerve and Pulses Intact  Musculoskeletal: Normal, except as noted in detailed exam and in HPI        MUSCULOSKELETAL EXAMINATION:    Right wrist:  Skin intact, nontender to palpate 5th metacarpal base fracture, no specific tenderness about the wrist, no clicking of the wrist noted, he will make full composite fist, improved range of motion and flexion and extension of the wrist, sensation intact radial, ulnar, and median nerve distribution, brisk capillary refill noted to all digits  ___________________________________________________  STUDIES REVIEWED:  None today    PROCEDURES PERFORMED:  Procedures  No Procedures performed today    _____________________________________________________      Elva Issa    I,:  Caden Sullivan am acting as a scribe while in the presence of the attending physician :       I,:  Yesika Aponte MD personally performed the services described in this documentation    as scribed in my presence :

## 2022-06-02 NOTE — LETTER
June 2, 2022     Patient: Dorothy Abernathy  YOB: 1995  Date of Visit: 6/2/2022      To Whom it May Concern:    Dorothy Abernathy is under my professional care  Leanne Anawilliam was seen in my office on 6/2/2022  Leanne Andino should continue to progress weight as previously noted  Anticipated return to full duty 7/5/22  He will be re-evaluated in 4 weeks  If you have any questions or concerns, please don't hesitate to call           Sincerely,          Essie Shirley MD        CC: No Recipients

## 2022-06-02 NOTE — PROGRESS NOTES
Daily Note     Today's date: 2022  Patient name: Glenna Mcgowan  : 1995  MRN: [id number]  Referring provider: Brenda Delcid MD  Dx:   Encounter Diagnosis     ICD-10-CM    1  Closed nondisplaced fracture of base of fifth metacarpal bone of right hand with routine healing, subsequent encounter  U07 986G                Subjective: "I was lifting something yesterday and I almost felt like I was straining my arm  It was hurting my bicep "    Objective: See treatment diary below     Precautions: Universal    Manuals HEP                        Ther Ex     Education on HEP and dx x5 min     AROM wrist flex/ext/RD/UD x10    AROM forearm rotation x10    PROM wrist flex/ext X3 10 sec X 10 therapist facilitated   Theraputty Moreno x 1 a day    Towel Scrunches  X 10   Juxtaciser  X 10   PROM pronation/supination  X 10 therapist facilitated   PROM radial/ulnar deviation  X 10 therapist facilitated   Pronation & Supination with #2 disc  X 10 each way   PRE's with #5 wrist flexion & extension, radial deviation   1 x 10   Gripper Setting #5  X 10   Green Flexbar Bends pronation and supination  X 10 each way   Flexbar Bends Vertical Plane - Green  X 5 each way   Digit Extension (Green)  X 15        Modalities     Heat  10-15 min 5 min          Therapist supervised patient with use of modalities during today's treatment session  Skin integrity was assessed and appeared normal following use of modalities  Assessment:  Pt tolerated today's treatment session well  Pt reports stiffness upon treatment session today and continued difficulty with lifting during functional activities  Progressed PRE's for strengthening with mild discomfort  Progressed to blue flexbar bends supination and pronation  Pt has a follow up with orthopedic following treatment session today  Will continue to progress as tolerated  Plan:   Focus on improved ROM and strengthening to improve ability to complete daily activites with ease    POC 5/16/2022-816/2022

## 2022-06-03 ENCOUNTER — TELEPHONE (OUTPATIENT)
Dept: OBGYN CLINIC | Facility: OTHER | Age: 27
End: 2022-06-03

## 2022-06-03 NOTE — TELEPHONE ENCOUNTER
Tam You @ [address] [place] [address] called to see if we can fax over Office Note & Work Letter      Fax # [phone]    C/b # [phone]

## 2022-06-06 ENCOUNTER — OFFICE VISIT (OUTPATIENT)
Dept: OCCUPATIONAL THERAPY | Facility: MEDICAL CENTER | Age: 27
End: 2022-06-06
Payer: OTHER MISCELLANEOUS

## 2022-06-06 DIAGNOSIS — S62.346D CLOSED NONDISPLACED FRACTURE OF BASE OF FIFTH METACARPAL BONE OF RIGHT HAND WITH ROUTINE HEALING, SUBSEQUENT ENCOUNTER: Primary | ICD-10-CM

## 2022-06-06 PROCEDURE — 97110 THERAPEUTIC EXERCISES: CPT

## 2022-06-06 NOTE — PROGRESS NOTES
Daily Note     Today's date: 2022  Patient name: Sergio Mishra  : 1995  MRN: [id number]  Referring provider: Jada Leiva MD  Dx:   Encounter Diagnosis     ICD-10-CM    1  Closed nondisplaced fracture of base of fifth metacarpal bone of right hand with routine healing, subsequent encounter  I31 923J                Subjective: "I saw Dr Henrry Krueger last week and she said just keep doing what we are doing with the strengthening and then hopefully I can transition back to work in a few weeks "    Objective: See treatment diary below     Precautions: Universal    Manuals HEP                        Ther Ex     Education on HEP and dx x5 min     AROM wrist flex/ext/RD/UD x10    AROM forearm rotation x10    PROM wrist flex/ext X3 10 sec X 10 therapist facilitated   Jasmeet Brian x 1 a day    Towel Scrunches  X 10   Juxtaciser  X 10   PROM pronation/supination  X 10 therapist facilitated   PROM radial/ulnar deviation  X 10 therapist facilitated   Pronation & Supination with #2 disc  X 10 each way   PRE's with #5 wrist flexion & extension, radial deviation   1 x 10   Gripper Setting #5     Green Flexbar Bends pronation and supination  X 10 each way   Flexbar Bends Vertical Plane - Green  X 5 each way   Digit Extension (Green)  X 15   BodyGlade  Time: 2 min vertical, 2 min horizontal   Tennis Ball in Holder - Wrist Proprioception  Time: 2 min counterclockwise, 2 min clockwise   Tennis Ball Gripper  2 x 24        Modalities     Heat  10-15 min 5 min          Therapist supervised patient with use of modalities during today's treatment session  Skin integrity was assessed and appeared normal following use of modalities  Assessment:  Pt tolerated today's treatment session well  Pt continues to progress with therapeutic exercise resistance for improved transition back to work  Progressed with strengthening exercises today  Introduced wrist proprioceptive exercises and educated on HEP, demonstrated understanding  Will continue to progress as tolerated  Plan:   Focus on improved ROM and strengthening to improve ability to complete daily activites with ease    POC 5/16/2022-816/2022

## 2022-06-08 ENCOUNTER — EVALUATION (OUTPATIENT)
Dept: OCCUPATIONAL THERAPY | Facility: MEDICAL CENTER | Age: 27
End: 2022-06-08
Payer: OTHER MISCELLANEOUS

## 2022-06-08 DIAGNOSIS — S62.346D CLOSED NONDISPLACED FRACTURE OF BASE OF FIFTH METACARPAL BONE OF RIGHT HAND WITH ROUTINE HEALING, SUBSEQUENT ENCOUNTER: Primary | ICD-10-CM

## 2022-06-08 PROCEDURE — 97110 THERAPEUTIC EXERCISES: CPT
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
## 2022-06-10 ENCOUNTER — OFFICE VISIT (OUTPATIENT)
Dept: OCCUPATIONAL THERAPY | Facility: MEDICAL CENTER | Age: 27
End: 2022-06-10
Payer: OTHER MISCELLANEOUS

## 2022-06-10 DIAGNOSIS — S62.346D CLOSED NONDISPLACED FRACTURE OF BASE OF FIFTH METACARPAL BONE OF RIGHT HAND WITH ROUTINE HEALING, SUBSEQUENT ENCOUNTER: Primary | ICD-10-CM

## 2022-06-10 PROCEDURE — 97110 THERAPEUTIC EXERCISES: CPT

## 2022-06-10 NOTE — PROGRESS NOTES
Daily Note     Today's date: 6/10/2022  Patient name: Alvaro Richardson  : 1995  MRN: [id number]  Referring provider: Hyacinth Perea MD  Dx:   Encounter Diagnosis     ICD-10-CM    1  Closed nondisplaced fracture of base of fifth metacarpal bone of right hand with routine healing, subsequent encounter  L84 770L                Subjective: No significant reported changes on this date  Objective: See treatment diary below     Precautions: Universal    Manuals HEP                        Ther Ex     Education on HEP and dx x5 min     AROM wrist flex/ext/RD/UD x10    AROM forearm rotation x10    PROM wrist flex/ext X3 10 sec X 10 therapist facilitated   Marlise Hare x 1 a day    Towel Scrunches  X 10   Juxtaciser  X 10   PROM pronation/supination  X 10 therapist facilitated   PROM radial/ulnar deviation  X 10 therapist facilitated   Pronation & Supination with #3 disc  X 10 each way   PRE's with #5 wrist flexion & extension, radial deviation   1 x 10   Gripper Setting #5     Blue Flexbar Bends pronation and supination  X 10 each way   Flexbar Bends Vertical Plane - Green  X 5 each way   Digit Extension (Green)  X 15   BodyBlade  Time: 2 min vertical, 2 min horizontal   Tennis Ball in [place] - Wrist Proprioception  Time: 2 min counterclockwise, 2 min clockwise   Tennis Ball Gripper          Modalities     Heat  10-15 min 5 min          Therapist supervised patient with use of modalities during today's treatment session  Skin integrity was assessed and appeared normal following use of modalities  Assessment:  Pt tolerated today's treatment session well  Pt continues to exhibit pain with PROM wrist flexion and extension  Continued with progressiong of therapeutic exercises to transition pt back to work-related tasks  Continued with wrist proprioceptive exercises, tolerated well  Will continue to progress as tolerated      Plan:   Focus on improved ROM and strengthening to improve ability to complete daily activites with ease    POC 5/16/2022-816/2022

## 2022-06-15 ENCOUNTER — OFFICE VISIT (OUTPATIENT)
Dept: OCCUPATIONAL THERAPY | Facility: MEDICAL CENTER | Age: 27
End: 2022-06-15
Payer: OTHER MISCELLANEOUS

## 2022-06-15 DIAGNOSIS — S62.346D CLOSED NONDISPLACED FRACTURE OF BASE OF FIFTH METACARPAL BONE OF RIGHT HAND WITH ROUTINE HEALING, SUBSEQUENT ENCOUNTER: Primary | ICD-10-CM

## 2022-06-15 PROCEDURE — 97110 THERAPEUTIC EXERCISES: CPT

## 2022-06-15 NOTE — PROGRESS NOTES
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 feel good "    Objective: See treatment diary below     Precautions: Universal    Manuals HEP                        Ther Ex     Education on HEP and dx x5 min     AROM wrist flex/ext/RD/UD x10    AROM forearm rotation x10    PROM wrist flex/ext X3 10 sec X 10 therapist facilitated   Theraputty Moreno x 1 a day    Towel Scrunches     Juxtaciser  X 10   PROM pronation/supination  X 10 therapist facilitated   PROM radial/ulnar deviation  X 10 therapist facilitated   Pronation & Supination with #3 disc  X 10 each way   PRE's with #5 wrist flexion & extension, radial deviation   1 x 10   Gripper Setting #5     Blue Flexbar Bends pronation and supination  X 10 each way   Flexbar Bends Vertical Plane - Blue  X 5 each way   Digit Extension (Black)  X 15   BodyBlade  Time: 2 min vertical, 2 min horizontal   Tennis Ball in [place] - Wrist Proprioception  Time: 2 min counterclockwise, 2 min clockwise   Tennis Ball Gripper          Modalities     Heat  10-15 min 5 min          Therapist supervised patient with use of modalities during today's treatment session  Skin integrity was assessed and appeared normal following use of modalities  Assessment:  Pt tolerated today's treatment session well  Pt is continuing to report mild pain with functional daily tasks  Pt was able to progress with therapeutic exercise today, mild discomfort  Pt will be transitioning back to work in a few weeks  Will continue to progress as tolerated      Plan:   Focus on improved ROM and strengthening to improve ability to complete daily activites with ease    POC 5/16/2022-816/2022

## 2022-06-17 ENCOUNTER — OFFICE VISIT (OUTPATIENT)
Dept: OCCUPATIONAL THERAPY | Facility: MEDICAL CENTER | Age: 27
End: 2022-06-17
Payer: OTHER MISCELLANEOUS

## 2022-06-17 DIAGNOSIS — S62.346D CLOSED NONDISPLACED FRACTURE OF BASE OF FIFTH METACARPAL BONE OF RIGHT HAND WITH ROUTINE HEALING, SUBSEQUENT ENCOUNTER: Primary | ICD-10-CM

## 2022-06-17 PROCEDURE — 97110 THERAPEUTIC EXERCISES: CPT

## 2022-06-17 NOTE — PROGRESS NOTES
Daily Note     Today's date: 2022  Patient name: Aniceto Lawton  : 1995  MRN: [id number]  Referring provider: Aurora Dent MD  Dx:   Encounter Diagnosis     ICD-10-CM    1  Closed nondisplaced fracture of base of fifth metacarpal bone of right hand with routine healing, subsequent encounter  A55 271O                Subjective: No significant reported changes on this date  Objective: See treatment diary below     Precautions: Universal    Manuals HEP                        Ther Ex     Education on HEP and dx x5 min     AROM wrist flex/ext/RD/UD x10    AROM forearm rotation x10    PROM wrist flex/ext X3 10 sec X 10 therapist facilitated   Theraputty Moreno x 1 a day    Towel Scrunches     Juxtaciser  X 10   PROM pronation/supination  X 10 therapist facilitated   PROM radial/ulnar deviation  X 10 therapist facilitated   Pronation & Supination with #3 disc  X 10 each way   PRE's with #6 wrist flexion & extension, radial deviation   1 x 10   Gripper Setting #5     Blue Flexbar Bends pronation and supination  X 10 each way   Flexbar Bends Vertical Plane - Blue     Digit Extension (Black)     BodyBlade  Time: 2 min vertical, 2 min horizontal   Tennis Ball in [place] - Wrist Proprioception     Tennis Ball Gripper          Modalities     Heat  10-15 min 5 min          Therapist supervised patient with use of modalities during today's treatment session  Skin integrity was assessed and appeared normal following use of modalities  Assessment:  Pt tolerated today's treatment session well  Able to progress pt with therapeutic activities today  Increased weight from #5 to #6 with PRE's, tolerated well with only mild discomfort noted  Will continue to progress as tolerated  Plan:   Focus on improved ROM and strengthening to improve ability to complete daily activites with ease    POC 2022-812022

## 2022-06-21 ENCOUNTER — OFFICE VISIT (OUTPATIENT)
Dept: OCCUPATIONAL THERAPY | Facility: MEDICAL CENTER | Age: 27
End: 2022-06-21
Payer: OTHER MISCELLANEOUS

## 2022-06-21 DIAGNOSIS — S62.346D CLOSED NONDISPLACED FRACTURE OF BASE OF FIFTH METACARPAL BONE OF RIGHT HAND WITH ROUTINE HEALING, SUBSEQUENT ENCOUNTER: Primary | ICD-10-CM

## 2022-06-21 PROCEDURE — 97110 THERAPEUTIC EXERCISES: CPT

## 2022-06-21 NOTE — PROGRESS NOTES
Daily Note     Today's date: 2022  Patient name: Sergio Mishra  : 1995  MRN: [id number]  Referring provider: Jada Leiva MD  Dx:   Encounter Diagnosis     ICD-10-CM    1  Closed nondisplaced fracture of base of fifth metacarpal bone of right hand with routine healing, subsequent encounter  P10 172T                Subjective: No significant reported changes on this date  Objective: See treatment diary below     Precautions: Universal    Manuals HEP                        Ther Ex     Education on HEP and dx x5 min     AROM wrist flex/ext/RD/UD x10    AROM forearm rotation x10    PROM wrist flex/ext X3 10 sec X 10 therapist facilitated   Theraputty Moreno x 1 a day    Towel Scrunches     Juxtaciser  X 10   PROM pronation/supination  X 10 therapist facilitated   PROM radial/ulnar deviation  X 10 therapist facilitated   Pronation & Supination with #3 disc  2 X 10 each way   PRE's with #6 wrist flexion & extension, radial deviation   2 x 10   Gripper Setting #5  X 15   Blue Flexbar Bends pronation and supination  X 10 each way   Flexbar Bends Vertical Plane - Blue     Digit Extension (Black)     BodyBlade      [employer] in [place] - Wrist Proprioception     Tennis Ball Gripper     Juxtaciser Tools Yellow Putty  Time: 10 min   Modalities     Heat  10-15 min 5 min          Therapist supervised patient with use of modalities during today's treatment session  Skin integrity was assessed and appeared normal following use of modalities  Assessment:  Pt tolerated today's treatment session well  Pt continued with therapeutic exercises today, will progress pt next treatment session  Introduced Juxtaciser tools for improved performance with functional tasks (turning doorknob, opening jar, turning key)  Will continue to progress as tolerated  Plan:   Focus on improved ROM and strengthening to improve ability to complete daily activites with ease    POC 2022-812022

## 2022-06-23 ENCOUNTER — OFFICE VISIT (OUTPATIENT)
Dept: OCCUPATIONAL THERAPY | Facility: MEDICAL CENTER | Age: 27
End: 2022-06-23
Payer: OTHER MISCELLANEOUS

## 2022-06-23 DIAGNOSIS — S62.346D CLOSED NONDISPLACED FRACTURE OF BASE OF FIFTH METACARPAL BONE OF RIGHT HAND WITH ROUTINE HEALING, SUBSEQUENT ENCOUNTER: Primary | ICD-10-CM

## 2022-06-23 PROCEDURE — 97110 THERAPEUTIC EXERCISES: CPT

## 2022-06-23 NOTE — PROGRESS NOTES
Daily Note     Today's date: 2022  Patient name: Jordan Welch  : 1995  MRN: [id number]  Referring provider: Melissa Torres MD  Dx:   Encounter Diagnosis     ICD-10-CM    1  Closed nondisplaced fracture of base of fifth metacarpal bone of right hand with routine healing, subsequent encounter  U35 731U                Subjective: No significant reported changes on this date  Objective: See treatment diary below     Precautions: Universal    Manuals HEP                        Ther Ex     Education on HEP and dx x5 min     AROM wrist flex/ext/RD/UD x10    AROM forearm rotation x10    PROM wrist flex/ext X3 10 sec X 10 therapist facilitated   Theraputty Moreno x 1 a day    Towel Scrunches     Juxtaciser  X 10   PROM pronation/supination  X 10 therapist facilitated   PROM radial/ulnar deviation  X 10 therapist facilitated   Pronation & Supination with #4   2 X 10 each way   PRE's with #7 wrist flexion & extension, radial deviation   2 x 10   Gripper Setting #5  X 20   Blue Flexbar Bends pronation and supination  X 10 each way   Flexbar Bends Vertical Plane - Blue     Digit Extension (Black)  X 10   BodyBlade      Tennis Ball in [place] - Wrist Proprioception     Tennis Ball Gripper     Juxtaciser Tools Yellow Putty  Time: 10 min   Modalities     Heat  10-15 min 5 min          Therapist supervised patient with use of modalities during today's treatment session  Skin integrity was assessed and appeared normal following use of modalities  Assessment:  Pt tolerated today's treatment session well  Pt continued with therapeutic exercises today,  Progressed with PRE's from #6 to #7 today and supination/pronation from #3 to #4 today  Will continue to progress as tolerated  Plan:   Focus on improved ROM and strengthening to improve ability to complete daily activites with ease    POC 2022-812022

## 2022-06-24 ENCOUNTER — OFFICE VISIT (OUTPATIENT)
Dept: OCCUPATIONAL THERAPY | Facility: MEDICAL CENTER | Age: 27
End: 2022-06-24
Payer: OTHER MISCELLANEOUS

## 2022-06-24 DIAGNOSIS — S62.346D CLOSED NONDISPLACED FRACTURE OF BASE OF FIFTH METACARPAL BONE OF RIGHT HAND WITH ROUTINE HEALING, SUBSEQUENT ENCOUNTER: Primary | ICD-10-CM

## 2022-06-24 PROCEDURE — 97110 THERAPEUTIC EXERCISES: CPT

## 2022-06-24 NOTE — PROGRESS NOTES
Daily Note     Today's date: 2022  Patient name: Thomas Carrington  : 1995  MRN: [id number]  Referring provider: Kassie Carrion MD  Dx:   Encounter Diagnosis     ICD-10-CM    1  Closed nondisplaced fracture of base of fifth metacarpal bone of right hand with routine healing, subsequent encounter  S62 346D      Start Time: 0800  Stop Time: 0858  Total time in clinic (min): 58 minutes   Subjective: "It hurt when I tried a push up "    Objective: See treatment diary below     Precautions: Universal    Manuals HEP                        Ther Ex     Education on HEP and dx x5 min     AROM wrist flex/ext/RD/UD x10    AROM forearm rotation x10    PROM wrist flex/ext X3 10 sec X 10 therapist facilitated   Theraputty Moreno x 1 a day    Towel Scrunches     Juxtaciser  X 10   PROM pronation/supination  X 10 therapist facilitated   PROM radial/ulnar deviation  X 10 therapist facilitated   Pronation & Supination with #4   2 X 10 each way   PRE's with #7 wrist flexion & extension, radial deviation   2 x 10   Gripper Setting #5  X 20   Blue Flexbar Bends pronation and supination  X 10 each way   Flexbar Bends Vertical Plane - Blue     Digit Extension (Black)  X 10   BodyBlade      Tennis Ball in [place] - Wrist Proprioception     Tennis Ball Gripper     Juxtaciser Tools Yellow Putty  Time: 10 min   Weight Bearing   X 10, 10 sec holds             Modalities     Heat  10-15 min 5 min          Therapist supervised patient with use of modalities during today's treatment session  Skin integrity was assessed and appeared normal following use of modalities  Assessment:  Pt tolerated today's treatment session well  Pt continued with therapeutic exercises today  Introduced weight bearing through LUE due to reported discomfort with push ups with arms from sitting to standing  Will continue to progress as tolerated  Pt has a follow up with orthopedics on 2022      Plan:   Focus on improved ROM and strengthening to improve ability to complete daily activites with ease    POC 5/16/2022-816/2022

## 2022-06-27 ENCOUNTER — OFFICE VISIT (OUTPATIENT)
Dept: OCCUPATIONAL THERAPY | Facility: MEDICAL CENTER | Age: 27
End: 2022-06-27
Payer: OTHER MISCELLANEOUS

## 2022-06-27 DIAGNOSIS — S62.346D CLOSED NONDISPLACED FRACTURE OF BASE OF FIFTH METACARPAL BONE OF RIGHT HAND WITH ROUTINE HEALING, SUBSEQUENT ENCOUNTER: Primary | ICD-10-CM

## 2022-06-27 PROCEDURE — 97110 THERAPEUTIC EXERCISES: CPT

## 2022-06-27 NOTE — PROGRESS NOTES
Daily Note     Today's date: 2022  Patient name: Fatou Oliver  : 1995  MRN: [id number]  Referring provider: Puma Magaña MD  Dx:   Encounter Diagnosis     ICD-10-CM    1  Closed nondisplaced fracture of base of fifth metacarpal bone of right hand with routine healing, subsequent encounter  S62 346D      Start Time: 930  Stop Time: 7589  Total time in clinic (min): 45 minutes   Subjective: No significant reported changes on this date  Objective: See treatment diary below     Precautions: Universal    Manuals HEP                        Ther Ex     Education on HEP and dx x5 min     AROM wrist flex/ext/RD/UD x10    AROM forearm rotation x10    PROM wrist flex/ext X3 10 sec X 10 therapist facilitated   Theraputty Moreno x 1 a day    Towel Scrunches     Juxtaciser  X 10   PROM pronation/supination  X 10 therapist facilitated   PROM radial/ulnar deviation  X 10 therapist facilitated   Pronation & Supination with #4   2 X 10 each way   PRE's with #8 wrist flexion & extension, radial deviation   2 x 10   Gripper Setting #5  X 20   Blue Flexbar Bends pronation and supination  X 10 each way   Flexbar Bends Vertical Plane - Blue     Digit Extension (Black)  X 10   BodyBlade      Tennis Ball in [place] - Wrist Proprioception     Tennis Ball Gripper     Juxtaciser Tools Yellow Putty  Time: 10 min   Weight Bearing   X 10, 10 sec holds             Modalities     Heat  10-15 min 5 min          Therapist supervised patient with use of modalities during today's treatment session  Skin integrity was assessed and appeared normal following use of modalities  Assessment:  Pt tolerated today's treatment session well  Pt continued with therapeutic exercises today  Progressed pt with therapeutic exercises from #7 to #8  Juxtaciser tools completed in standing to increase challenge with weight bearing  Pt has a follow up with orthopedics on 2022      Plan:   Focus on improved ROM and strengthening to improve ability to complete daily activites with ease    POC 5/16/2022-816/2022

## 2022-06-29 ENCOUNTER — APPOINTMENT (OUTPATIENT)
Dept: OCCUPATIONAL THERAPY | Facility: MEDICAL CENTER | Age: 27
End: 2022-06-29
Payer: OTHER MISCELLANEOUS

## 2022-07-01 ENCOUNTER — OFFICE VISIT (OUTPATIENT)
Dept: OCCUPATIONAL THERAPY | Facility: MEDICAL CENTER | Age: 27
End: 2022-07-01
Payer: OTHER MISCELLANEOUS

## 2022-07-01 DIAGNOSIS — S62.346D CLOSED NONDISPLACED FRACTURE OF BASE OF FIFTH METACARPAL BONE OF RIGHT HAND WITH ROUTINE HEALING, SUBSEQUENT ENCOUNTER: Primary | ICD-10-CM

## 2022-07-01 PROCEDURE — 97110 THERAPEUTIC EXERCISES: CPT

## 2022-07-01 NOTE — PROGRESS NOTES
Daily Note     Today's date: 2022  Patient name: Elsi Rice  : 1995  MRN: [id number]  Referring provider: Gabi Ruiz MD  Dx:   Encounter Diagnosis     ICD-10-CM    1  Closed nondisplaced fracture of base of fifth metacarpal bone of right hand with routine healing, subsequent encounter  R42 537C                Subjective: "It hurts when I bend my wrist left "    Objective: See treatment diary below     Precautions: Universal    Manuals HEP                        Ther Ex     Education on HEP and dx x5 min     AROM wrist flex/ext/RD/UD x10    AROM forearm rotation x10    PROM wrist flex/ext X3 10 sec X 10 therapist facilitated   Berl Pesa x 1 a day    Towel Scrunches     Juxtaciser  X 10   PROM pronation/supination  X 10 therapist facilitated   PROM radial/ulnar deviation  X 10 therapist facilitated   Pronation & Supination with #4   2 X 10 each way   PRE's with #8 wrist flexion & extension, radial deviation   2 x 10   Gripper Setting #5  X 20   Blue Flexbar Bends pronation and supination  X 10 each way   Flexbar Bends Vertical Plane - Blue  X 15   Blue FlexBar Twists  x15 prograde,  15 retrograde   Digit Extension (Green)  X 10   BodyBlade      Tennis Ball in [place] - Wrist Proprioception     Tennis Ball Gripper     Juxtaciser Tools Yellow Putty  Time: 10 min   Weight Bearing   X 10, 10 sec holds             Modalities     Heat  10-15 min 5 min          Therapist supervised patient with use of modalities during today's treatment session  Skin integrity was assessed and appeared normal following use of modalities  Assessment:  Pt tolerated today's treatment session well  Pt continued with therapeutic exercises today  Continued with therapeutic exercises using #8 weight today, tolerated well  Pain with palpation on dorsal side of wrist crease  Will continue to progress pt as tolerated  Pt will be discharged on       Plan:   Focus on improved ROM and strengthening to improve ability to complete daily activites with ease    POC 5/16/2022-816/2022

## 2022-07-05 ENCOUNTER — OFFICE VISIT (OUTPATIENT)
Dept: OCCUPATIONAL THERAPY | Facility: MEDICAL CENTER | Age: 27
End: 2022-07-05
Payer: OTHER MISCELLANEOUS

## 2022-07-05 DIAGNOSIS — S62.346D CLOSED NONDISPLACED FRACTURE OF BASE OF FIFTH METACARPAL BONE OF RIGHT HAND WITH ROUTINE HEALING, SUBSEQUENT ENCOUNTER: Primary | ICD-10-CM

## 2022-07-05 PROCEDURE — 97110 THERAPEUTIC EXERCISES: CPT
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
## 2022-07-06 ENCOUNTER — OFFICE VISIT (OUTPATIENT)
Dept: OBGYN CLINIC | Facility: CLINIC | Age: 27
End: 2022-07-06
Payer: OTHER MISCELLANEOUS

## 2022-07-06 VITALS
HEART RATE: 73 BPM | SYSTOLIC BLOOD PRESSURE: 112 MMHG | DIASTOLIC BLOOD PRESSURE: 67 MMHG | BODY MASS INDEX: 18.04 KG/M2 | HEIGHT: 70 IN | WEIGHT: 126 LBS

## 2022-07-06 DIAGNOSIS — S62.346D CLOSED NONDISPLACED FRACTURE OF BASE OF FIFTH METACARPAL BONE OF RIGHT HAND WITH ROUTINE HEALING, SUBSEQUENT ENCOUNTER: Primary | ICD-10-CM

## 2022-07-06 PROCEDURE — 99213 OFFICE O/P EST LOW 20 MIN: CPT | Performed by: SURGERY

## 2022-07-06 NOTE — PROGRESS NOTES
ASSESSMENT/PLAN:      27 yr old male right 5th metacarpal base fracture, date of injury 02/06/2022  Patient is doing well on exam today  He may return to full duty without restrictions  The patient verbalized understanding of exam findings and treatment plan  We engaged in the shared decision-making process and treatment options were discussed at length with the patient  Surgical and conservative management discussed today along with risks and benefits  Diagnoses and all orders for this visit:    Closed nondisplaced fracture of base of fifth metacarpal bone of right hand with routine healing, subsequent encounter        Follow Up:  Return if symptoms worsen or fail to improve         ____________________________________________________________________________________________________________________________________________      CHIEF COMPLAINT:  Chief Complaint   Patient presents with    Right Hand - Follow-up       SUBJECTIVE:  Jacquie Bojorquez is a 27y o  year old  RHD male who presents right 5th metacarpal base fracture, date of injury 02/06/2022  Patient has been attending therapy as instructed  Patient states he feels better than the last visit  I have personally reviewed all the relevant PMH, PSH, SH, FH, Medications and allergies       PAST MEDICAL HISTORY:  Past Medical History:   Diagnosis Date    Hypospadias     as a baby       PAST SURGICAL HISTORY:  Past Surgical History:   Procedure Laterality Date    HERNIA REPAIR  1998    OTHER SURGICAL HISTORY      coils placed by urology     TONSILLECTOMY  1997       FAMILY HISTORY:  Family History   Problem Relation Age of Onset    No Known Problems Mother     No Known Problems Father        SOCIAL HISTORY:  Social History     Tobacco Use    Smoking status: Current Every Day Smoker     Packs/day: 1 00    Smokeless tobacco: Never Used   Vaping Use    Vaping Use: Never used   Substance Use Topics    Alcohol use: Yes     Comment: social  Drug use: No       MEDICATIONS:    Current Outpatient Medications:     ibuprofen (MOTRIN) 600 mg tablet, Take by mouth every 6 (six) hours as needed for mild pain (Patient not taking: Reported on 7/6/2022), Disp: , Rfl:     methocarbamol (ROBAXIN) 500 mg tablet, Take 1 tablet (500 mg total) by mouth every 6 (six) hours as needed for muscle spasms (Patient not taking: No sig reported), Disp: 15 tablet, Rfl: 0    methylPREDNISolone 4 MG tablet therapy pack, Use as directed on package (Patient not taking: No sig reported), Disp: 1 each, Rfl: 0    ALLERGIES:  Allergies   Allergen Reactions    Iodinated Diagnostic Agents     Penicillins Other (See Comments)     Pt could not remember at this time       REVIEW OF SYSTEMS:  Review of Systems   Constitutional: Negative for chills and fever  HENT: Negative for drooling and hearing loss  Eyes: Negative for visual disturbance  Respiratory: Negative for cough and shortness of breath  Cardiovascular: Negative for chest pain  Gastrointestinal: Negative for abdominal pain  Skin: Negative for rash  Psychiatric/Behavioral: Negative for agitation         VITALS:  Vitals:    07/06/22 1404   BP: 112/67   Pulse: 73       LABS:  HgA1c:   Lab Results   Component Value Date    HGBA1C 5 3 03/02/2022     BMP:   Lab Results   Component Value Date    CALCIUM 8 8 03/02/2022    K 4 1 03/02/2022    CO2 28 03/02/2022     03/02/2022    BUN 15 03/02/2022    CREATININE 0 89 03/02/2022       _____________________________________________________  PHYSICAL EXAMINATION:  General: well developed and well nourished, alert, oriented times 3 and appears comfortable  Psychiatric: Normal  HEENT: Normocephalic, Atraumatic Trachea Midline, No torticollis  Pulmonary: No audible wheezing or respiratory distress   Cardiovascular: No pitting edema, 2+ radial pulse   Abdominal/GI: abdomen non tender, non distended   Skin: No masses, erythema, lacerations, fluctation, ulcerations  Neurovascular: Sensation Intact to the Median, Ulnar, Radial Nerve, Motor Intact to the Median, Ulnar, Radial Nerve and Pulses Intact  Musculoskeletal: Normal, except as noted in detailed exam and in HPI        MUSCULOSKELETAL EXAMINATION:  Right hand:  No swelling or ecchymosis  Non TTP over the fracture site  Full composite fist  ___________________________________________________  STUDIES REVIEWED:  No new studies      PROCEDURES PERFORMED:  Procedures  No Procedures performed today    _____________________________________________________      Phoenix Stephenson    I,:  Fidel Hart am acting as a scribe while in the presence of the attending physician :       I,:  Isac Marinelli MD personally performed the services described in this documentation    as scribed in my presence :

## 2022-07-06 NOTE — LETTER
July 6, 2022     Patient: Josh Roman  YOB: 1995  Date of Visit: 7/6/2022      To Whom it May Concern:    Josh Roman is under my professional care  Debbi Page was seen in my office on 7/6/2022  Debbi Page may return to full duty without restriction on 7/07/22    If you have any questions or concerns, please don't hesitate to call           Sincerely,          Gina Euceda MD        CC: No Recipients

## 2022-07-07 ENCOUNTER — APPOINTMENT (OUTPATIENT)
Dept: OCCUPATIONAL THERAPY | Facility: MEDICAL CENTER | Age: 27
End: 2022-07-07
Payer: OTHER MISCELLANEOUS

## 2022-07-07 ENCOUNTER — TELEPHONE (OUTPATIENT)
Dept: OBGYN CLINIC | Facility: HOSPITAL | Age: 27
End: 2022-07-07

## 2022-07-07 NOTE — TELEPHONE ENCOUNTER
Hannah Bolivar with [employer] calling in asking if she could receive the office notes and work status letter from yesterday's office visit  Faxed to number requested below      Fax # [phone]  cb # [phone]

## 2022-11-18 ENCOUNTER — HOSPITAL ENCOUNTER (EMERGENCY)
Facility: HOSPITAL | Age: 27
Discharge: HOME | End: 2022-11-18

## 2022-11-18 ENCOUNTER — APPOINTMENT (EMERGENCY)
Dept: RADIOLOGY | Facility: HOSPITAL | Age: 27
End: 2022-11-18

## 2022-11-18 ENCOUNTER — APPOINTMENT (EMERGENCY)
Dept: CT IMAGING | Facility: HOSPITAL | Age: 27
End: 2022-11-18

## 2022-11-18 VITALS
TEMPERATURE: 97.2 F | SYSTOLIC BLOOD PRESSURE: 129 MMHG | OXYGEN SATURATION: 97 % | HEIGHT: 70 IN | WEIGHT: 141.09 LBS | OXYGEN SATURATION: 97 % | DIASTOLIC BLOOD PRESSURE: 83 MMHG | RESPIRATION RATE: 18 BRPM | BODY MASS INDEX: 20.2 KG/M2 | BODY MASS INDEX: 20.2 KG/M2 | SYSTOLIC BLOOD PRESSURE: 129 MMHG | HEIGHT: 70 IN | DIASTOLIC BLOOD PRESSURE: 83 MMHG | HEART RATE: 87 BPM | WEIGHT: 141.09 LBS | HEART RATE: 87 BPM | RESPIRATION RATE: 18 BRPM | TEMPERATURE: 97.2 F
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
## 2022-11-18 LAB
BASE EXCESS BLDA CALC-SCNC: 0 MMOL/L (ref -2–3)
BASE EXCESS BLDA CALC-SCNC: 0 MMOL/L (ref -2–3)
CA-I BLD-SCNC: 1.13 MMOL/L (ref 1.12–1.32)
CA-I BLD-SCNC: 1.13 MMOL/L (ref 1.12–1.32)
GLUCOSE SERPL-MCNC: 115 MG/DL (ref 65–140)
GLUCOSE SERPL-MCNC: 115 MG/DL (ref 65–140)
HCO3 BLDA-SCNC: 25 MMOL/L (ref 24–30)
HCO3 BLDA-SCNC: 25 MMOL/L (ref 24–30)
HCT VFR BLD CALC: 46 % (ref 36.5–49.3)
HCT VFR BLD CALC: 46 % (ref 36.5–49.3)
HGB BLDA-MCNC: 15.6 G/DL (ref 12–17)
HGB BLDA-MCNC: 15.6 G/DL (ref 12–17)
PCO2 BLD: 26 MMOL/L (ref 21–32)
PCO2 BLD: 26 MMOL/L (ref 21–32)
PCO2 BLD: 42.6 MM HG (ref 42–50)
PCO2 BLD: 42.6 MM HG (ref 42–50)
PH BLD: 7.38 [PH] (ref 7.3–7.4)
PH BLD: 7.38 [PH] (ref 7.3–7.4)
PO2 BLD: 75 MM HG (ref 35–45)
PO2 BLD: 75 MM HG (ref 35–45)
POTASSIUM BLD-SCNC: 3.4 MMOL/L (ref 3.5–5.3)
POTASSIUM BLD-SCNC: 3.4 MMOL/L (ref 3.5–5.3)
SAO2 % BLD FROM PO2: 94 % (ref 60–85)
SAO2 % BLD FROM PO2: 94 % (ref 60–85)
SODIUM BLD-SCNC: 139 MMOL/L (ref 136–145)
SODIUM BLD-SCNC: 139 MMOL/L (ref 136–145)
SPECIMEN SOURCE: ABNORMAL
SPECIMEN SOURCE: ABNORMAL

## 2022-11-18 RX ORDER — DIPHENHYDRAMINE HYDROCHLORIDE 50 MG/ML
INJECTION INTRAMUSCULAR; INTRAVENOUS CODE/TRAUMA/SEDATION MEDICATION
Status: COMPLETED | OUTPATIENT
Start: 2022-11-18 | End: 2022-11-18

## 2022-11-18 RX ORDER — FENTANYL CITRATE 50 UG/ML
1 INJECTION, SOLUTION INTRAMUSCULAR; INTRAVENOUS ONCE
Status: COMPLETED | OUTPATIENT
Start: 2022-11-18 | End: 2022-11-18

## 2022-11-18 RX ORDER — METHYLPREDNISOLONE SODIUM SUCCINATE 125 MG/2ML
INJECTION, POWDER, LYOPHILIZED, FOR SOLUTION INTRAMUSCULAR; INTRAVENOUS
Status: COMPLETED
Start: 2022-11-18 | End: 2022-11-18

## 2022-11-18 RX ORDER — GINSENG 100 MG
1 CAPSULE ORAL 2 TIMES DAILY
Status: DISCONTINUED | OUTPATIENT
Start: 2022-11-18 | End: 2022-11-18 | Stop reason: HOSPADM

## 2022-11-18 RX ADMIN — BACITRACIN 1 LARGE APPLICATION: 500 OINTMENT TOPICAL at 13:30

## 2022-11-18 RX ADMIN — IOHEXOL 90 ML: 350 INJECTION, SOLUTION INTRAVENOUS at 10:52

## 2022-11-18 RX ADMIN — DIPHENHYDRAMINE HYDROCHLORIDE 50 MG: 50 INJECTION, SOLUTION INTRAMUSCULAR; INTRAVENOUS at 10:45

## 2022-11-18 RX ADMIN — METHYLPREDNISOLONE SODIUM SUCCINATE: 125 INJECTION, POWDER, FOR SOLUTION INTRAMUSCULAR; INTRAVENOUS at 10:45

## 2022-11-18 NOTE — PROCEDURES
POC FAST US    Date/Time: 11/18/2022 10:39 AM  Performed by: MATTEO Steve  Authorized by: Angela Steve     Patient location:  Trauma  Procedure details:     Exam Type:  Diagnostic    Indications: blunt abdominal trauma and blunt chest trauma      Assess for:  Intra-abdominal fluid and pericardial effusion    Technique: FAST      Views obtained:  Heart - Pericardial sac, LUQ - Splenorenal space, Suprapubic - Pouch of Louis and RUQ - Herr's Pouch    Image quality: diagnostic      Image availability:  Images available in PACS and video obtained  FAST Findings:     RUQ (Hepatorenal) free fluid: absent      LUQ (Splenorenal) free fluid: absent      Suprapubic free fluid: absent      Cardiac wall motion: identified      Pericardial effusion: absent    extended FAST (Pulmonary) findings:     Left lung sliding: Present (Image not saved )      Right lung sliding: Present (Image not saved )    Interpretation:     Impressions: negative

## 2022-11-18 NOTE — DISCHARGE INSTRUCTIONS
As discussed, if you continue to have hand pain you may follow-up with your PCP for possible orthopedic consultation
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
## 2022-11-18 NOTE — ED NOTES
Legal blood draw completed at [id number],  received copies of legal blood draw documents as well specimen for lab       Maximiliano Marie  11/18/22 5213
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
## 2022-11-23 ENCOUNTER — HOSPITAL ENCOUNTER (EMERGENCY)
Facility: HOSPITAL | Age: 27
Discharge: HOME | End: 2022-11-23
Attending: EMERGENCY MEDICINE

## 2022-11-23 ENCOUNTER — APPOINTMENT (EMERGENCY)
Dept: CT IMAGING | Facility: HOSPITAL | Age: 27
End: 2022-11-23

## 2022-11-23 VITALS
TEMPERATURE: 99.1 F | HEART RATE: 58 BPM | RESPIRATION RATE: 16 BRPM | OXYGEN SATURATION: 98 % | SYSTOLIC BLOOD PRESSURE: 118 MMHG | DIASTOLIC BLOOD PRESSURE: 72 MMHG

## 2022-11-23 DIAGNOSIS — S62.143A CLOSED HAMATE FRACTURE: ICD-10-CM

## 2022-11-23 DIAGNOSIS — S62.92XA: Primary | ICD-10-CM

## 2022-11-23 DIAGNOSIS — S62.185A: ICD-10-CM

## 2022-11-23 LAB
ANION GAP SERPL CALCULATED.3IONS-SCNC: 7 MMOL/L (ref 4–13)
BASOPHILS # BLD AUTO: 0.05 THOUSANDS/ÂΜL (ref 0–0.1)
BASOPHILS NFR BLD AUTO: 1 % (ref 0–1)
BUN SERPL-MCNC: 11 MG/DL (ref 5–25)
CALCIUM SERPL-MCNC: 9.8 MG/DL (ref 8.4–10.2)
CHLORIDE SERPL-SCNC: 102 MMOL/L (ref 96–108)
CO2 SERPL-SCNC: 29 MMOL/L (ref 21–32)
CREAT SERPL-MCNC: 0.87 MG/DL (ref 0.6–1.3)
CRP SERPL QL: 1.3 MG/L
EOSINOPHIL # BLD AUTO: 0.08 THOUSAND/ÂΜL (ref 0–0.61)
EOSINOPHIL NFR BLD AUTO: 1 % (ref 0–6)
ERYTHROCYTE [DISTWIDTH] IN BLOOD BY AUTOMATED COUNT: 12.8 % (ref 11.6–15.1)
GFR SERPL CREATININE-BSD FRML MDRD: 116 ML/MIN/1.73SQ M
GLUCOSE SERPL-MCNC: 85 MG/DL (ref 65–140)
HCT VFR BLD AUTO: 43 % (ref 36.5–49.3)
HGB BLD-MCNC: 14.5 G/DL (ref 12–17)
IMM GRANULOCYTES # BLD AUTO: 0.04 THOUSAND/UL (ref 0–0.2)
IMM GRANULOCYTES NFR BLD AUTO: 0 % (ref 0–2)
LYMPHOCYTES # BLD AUTO: 1.95 THOUSANDS/ÂΜL (ref 0.6–4.47)
LYMPHOCYTES NFR BLD AUTO: 22 % (ref 14–44)
MCH RBC QN AUTO: 30.5 PG (ref 26.8–34.3)
MCHC RBC AUTO-ENTMCNC: 33.7 G/DL (ref 31.4–37.4)
MCV RBC AUTO: 91 FL (ref 82–98)
MONOCYTES # BLD AUTO: 0.46 THOUSAND/ÂΜL (ref 0.17–1.22)
MONOCYTES NFR BLD AUTO: 5 % (ref 4–12)
NEUTROPHILS # BLD AUTO: 6.42 THOUSANDS/ÂΜL (ref 1.85–7.62)
NEUTS SEG NFR BLD AUTO: 71 % (ref 43–75)
NRBC BLD AUTO-RTO: 0 /100 WBCS
PLATELET # BLD AUTO: 259 THOUSANDS/UL (ref 149–390)
PMV BLD AUTO: 9.4 FL (ref 8.9–12.7)
POTASSIUM SERPL-SCNC: 4 MMOL/L (ref 3.5–5.3)
RBC # BLD AUTO: 4.75 MILLION/UL (ref 3.88–5.62)
SODIUM SERPL-SCNC: 138 MMOL/L (ref 135–147)
WBC # BLD AUTO: 9 THOUSAND/UL (ref 4.31–10.16)

## 2022-11-23 RX ORDER — IODIXANOL 320 MG/ML
100 INJECTION, SOLUTION INTRAVASCULAR
Status: COMPLETED | OUTPATIENT
Start: 2022-11-23 | End: 2022-11-23

## 2022-11-23 RX ORDER — DIPHENHYDRAMINE HYDROCHLORIDE 50 MG/ML
50 INJECTION INTRAMUSCULAR; INTRAVENOUS ONCE
Status: COMPLETED | OUTPATIENT
Start: 2022-11-23 | End: 2022-11-23

## 2022-11-23 RX ORDER — NAPROXEN 500 MG/1
500 TABLET ORAL 2 TIMES DAILY WITH MEALS
Qty: 14 TABLET | Refills: 0 | Status: SHIPPED | OUTPATIENT
Start: 2022-11-23

## 2022-11-23 RX ORDER — CEPHALEXIN 500 MG/1
500 CAPSULE ORAL EVERY 6 HOURS SCHEDULED
Qty: 28 CAPSULE | Refills: 0 | Status: SHIPPED | OUTPATIENT
Start: 2022-11-23 | End: 2022-11-30

## 2022-11-23 RX ORDER — KETOROLAC TROMETHAMINE 30 MG/ML
15 INJECTION, SOLUTION INTRAMUSCULAR; INTRAVENOUS ONCE
Status: COMPLETED | OUTPATIENT
Start: 2022-11-23 | End: 2022-11-23

## 2022-11-23 RX ORDER — CEFTRIAXONE 1 G/50ML
1000 INJECTION, SOLUTION INTRAVENOUS ONCE
Status: COMPLETED | OUTPATIENT
Start: 2022-11-23 | End: 2022-11-23

## 2022-11-23 RX ORDER — METHYLPREDNISOLONE SODIUM SUCCINATE 125 MG/2ML
125 INJECTION, POWDER, LYOPHILIZED, FOR SOLUTION INTRAMUSCULAR; INTRAVENOUS ONCE
Status: COMPLETED | OUTPATIENT
Start: 2022-11-23 | End: 2022-11-23

## 2022-11-23 RX ADMIN — KETOROLAC TROMETHAMINE 15 MG: 30 INJECTION, SOLUTION INTRAMUSCULAR at 17:17

## 2022-11-23 RX ADMIN — IODIXANOL 85 ML: 320 INJECTION, SOLUTION INTRAVASCULAR at 19:13

## 2022-11-23 RX ADMIN — METHYLPREDNISOLONE SODIUM SUCCINATE 125 MG: 125 INJECTION, POWDER, FOR SOLUTION INTRAMUSCULAR; INTRAVENOUS at 18:40

## 2022-11-23 RX ADMIN — CEFTRIAXONE 1000 MG: 1 INJECTION, SOLUTION INTRAVENOUS at 17:33

## 2022-11-23 RX ADMIN — DIPHENHYDRAMINE HYDROCHLORIDE 50 MG: 50 INJECTION, SOLUTION INTRAMUSCULAR; INTRAVENOUS at 18:40
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
## 2022-11-25 ENCOUNTER — TELEPHONE (OUTPATIENT)
Dept: OBGYN CLINIC | Facility: HOSPITAL | Age: 27
End: 2022-11-25
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
## 2022-11-27 LAB
BACTERIA BLD CULT: NORMAL
BACTERIA BLD CULT: NORMAL

## 2022-11-28 LAB
BACTERIA BLD CULT: NORMAL
BACTERIA BLD CULT: NORMAL

## 2022-11-29 ENCOUNTER — OFFICE VISIT (OUTPATIENT)
Dept: OBGYN CLINIC | Facility: CLINIC | Age: 27
End: 2022-11-29

## 2022-11-29 VITALS
HEART RATE: 69 BPM | HEIGHT: 70 IN | DIASTOLIC BLOOD PRESSURE: 69 MMHG | SYSTOLIC BLOOD PRESSURE: 106 MMHG | WEIGHT: 128.4 LBS | BODY MASS INDEX: 18.38 KG/M2

## 2022-11-29 DIAGNOSIS — S62.185A: ICD-10-CM

## 2022-11-29 DIAGNOSIS — S62.309A MULTIPLE CLOSED FRACTURES OF SINGLE METACARPAL BONE, INITIAL ENCOUNTER: Primary | ICD-10-CM

## 2022-11-29 DIAGNOSIS — S62.145A CLOSED NONDISPLACED FRACTURE OF BODY OF HAMATE OF LEFT WRIST, INITIAL ENCOUNTER: ICD-10-CM

## 2022-11-29 RX ORDER — NAPROXEN 500 MG/1
500 TABLET, DELAYED RELEASE ORAL 2 TIMES DAILY WITH MEALS
COMMUNITY
Start: 2022-11-23

## 2022-11-29 RX ORDER — CEPHALEXIN 500 MG/1
CAPSULE ORAL
COMMUNITY
Start: 2022-11-23
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
## 2022-12-13 ENCOUNTER — OFFICE VISIT (OUTPATIENT)
Dept: OBGYN CLINIC | Facility: CLINIC | Age: 27
End: 2022-12-13

## 2022-12-13 ENCOUNTER — APPOINTMENT (OUTPATIENT)
Dept: RADIOLOGY | Facility: AMBULARY SURGERY CENTER | Age: 27
End: 2022-12-13
Attending: STUDENT IN AN ORGANIZED HEALTH CARE EDUCATION/TRAINING PROGRAM

## 2022-12-13 VITALS
WEIGHT: 129 LBS | HEART RATE: 69 BPM | DIASTOLIC BLOOD PRESSURE: 74 MMHG | BODY MASS INDEX: 18.47 KG/M2 | HEIGHT: 70 IN | OXYGEN SATURATION: 99 % | SYSTOLIC BLOOD PRESSURE: 110 MMHG

## 2022-12-13 DIAGNOSIS — S62.309D MULTIPLE CLOSED FRACTURES OF SINGLE METACARPAL BONE WITH ROUTINE HEALING, SUBSEQUENT ENCOUNTER: Primary | ICD-10-CM

## 2022-12-13 DIAGNOSIS — S62.185D: ICD-10-CM

## 2022-12-13 DIAGNOSIS — S62.145A CLOSED NONDISPLACED FRACTURE OF BODY OF HAMATE OF LEFT WRIST, INITIAL ENCOUNTER: ICD-10-CM

## 2022-12-13 DIAGNOSIS — S62.145D CLOSED NONDISPLACED FRACTURE OF BODY OF HAMATE OF LEFT WRIST WITH ROUTINE HEALING, SUBSEQUENT ENCOUNTER: ICD-10-CM
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
## 2022-12-27 ENCOUNTER — EVALUATION (OUTPATIENT)
Dept: OCCUPATIONAL THERAPY | Facility: MEDICAL CENTER | Age: 27
End: 2022-12-27

## 2022-12-27 DIAGNOSIS — S62.145D CLOSED NONDISPLACED FRACTURE OF BODY OF HAMATE OF LEFT WRIST WITH ROUTINE HEALING, SUBSEQUENT ENCOUNTER: Primary | ICD-10-CM

## 2022-12-27 DIAGNOSIS — S62.309D MULTIPLE CLOSED FRACTURES OF SINGLE METACARPAL BONE WITH ROUTINE HEALING, SUBSEQUENT ENCOUNTER: ICD-10-CM

## 2022-12-27 DIAGNOSIS — S62.185D: ICD-10-CM
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
## 2023-01-04 ENCOUNTER — OFFICE VISIT (OUTPATIENT)
Dept: OCCUPATIONAL THERAPY | Facility: MEDICAL CENTER | Age: 28
End: 2023-01-04

## 2023-01-04 DIAGNOSIS — S62.309D MULTIPLE CLOSED FRACTURES OF SINGLE METACARPAL BONE WITH ROUTINE HEALING, SUBSEQUENT ENCOUNTER: ICD-10-CM

## 2023-01-04 DIAGNOSIS — S62.145D CLOSED NONDISPLACED FRACTURE OF BODY OF HAMATE OF LEFT WRIST WITH ROUTINE HEALING, SUBSEQUENT ENCOUNTER: Primary | ICD-10-CM

## 2023-01-04 DIAGNOSIS — S62.185D: ICD-10-CM

## 2023-01-04 NOTE — PROGRESS NOTES
Daily Note     Today's date: 2023  Patient name: Karen Fortune  : 1995  MRN: [id number]  Referring provider: Mateusz French MD  Dx:   Encounter Diagnosis     ICD-10-CM    1  Closed nondisplaced fracture of body of hamate of left wrist with routine healing, subsequent encounter  S62 145D       2  Multiple closed fractures of single metacarpal bone with routine healing, subsequent encounter  S62 309D       3  Closed nondisplaced fracture of trapezoid of left wrist with routine healing, subsequent encounter  S62 185D                      Subjective: "This part of my wrist is really painful  (dorsal surface of wrist crease) "    Objective: See treatment diary below     Precautions: Evaluate and Treat 2-3 times a week for 4-6 weeks ROM as tolerated can start strengthening in 2 weeks    Manuals HEP    IASTM of dorsal and volar surface of wrist/forearm  Dorsal surface of hand  Time: 10 minutes                  Ther Ex     Education on HEP and dx x5 min     AROM tendon glides x10 X 10   AROM table top extension x10 X 10   AROM digit add/abduction x10 X 10   AROM wrist flex/ext/RD/UD x10 X 10   AROM forearm rotation x10 X 10   PROM wrist flex/ext 3 x 20 second hold    Juxtaciser  X 20   Towel Scrunches   X 10   PROM wrist flexion and extension therapist facilitated   X 10 with 5 second holds each way   PROM MP flexion and extension  X 10 with 5 second holds each way             Modalities     Heat 10-15 min 5 min          Therapist supervised patient with use of modalities during today's treatment session  Skin integrity was assessed and appeared normal following use of modalities  Assessment:   Pt is continuing to benefit from OT treatment services  Discomfort with IASTM on dorsal surface of wrist and forearm  Fair wrist ROM obtained  Progressed to PROM today with wrist flexion, extension, MP flexion, and MP extension  Will continue to progress pt as tolerated to strengthening in two more weeks  Plan:   Focus on ROM and strengthening to improve ability to complete daily activites with ease    POC 12/27-3/7

## 2023-01-06 ENCOUNTER — OFFICE VISIT (OUTPATIENT)
Dept: OCCUPATIONAL THERAPY | Facility: MEDICAL CENTER | Age: 28
End: 2023-01-06

## 2023-01-06 DIAGNOSIS — S62.309D MULTIPLE CLOSED FRACTURES OF SINGLE METACARPAL BONE WITH ROUTINE HEALING, SUBSEQUENT ENCOUNTER: ICD-10-CM

## 2023-01-06 DIAGNOSIS — S62.145D CLOSED NONDISPLACED FRACTURE OF BODY OF HAMATE OF LEFT WRIST WITH ROUTINE HEALING, SUBSEQUENT ENCOUNTER: Primary | ICD-10-CM

## 2023-01-06 DIAGNOSIS — S62.185D: ICD-10-CM

## 2023-01-06 NOTE — PROGRESS NOTES
Daily Note     Today's date: 2023  Patient name: Poncho Martinez  : 1995  MRN: [id number]  Referring provider: Lan Sanabria MD  Dx:   Encounter Diagnosis     ICD-10-CM    1  Closed nondisplaced fracture of body of hamate of left wrist with routine healing, subsequent encounter  S62 145D       2  Multiple closed fractures of single metacarpal bone with routine healing, subsequent encounter  S62 309D       3  Closed nondisplaced fracture of trapezoid of left wrist with routine healing, subsequent encounter  S62 185D                      Subjective: "It feels stiff "    Objective: See treatment diary below     Precautions: Evaluate and Treat 2-3 times a week for 4-6 weeks ROM as tolerated can start strengthening in 2 weeks    Manuals HEP    IASTM of dorsal and volar surface of wrist/forearm  Dorsal surface of hand  Time: 10 minutes                  Ther Ex     Education on HEP and dx x5 min     AROM tendon glides x10 X 10   AROM table top extension x10 X 10   AROM digit add/abduction x10 X 10   AROM wrist flex/ext/RD/UD x10 X 10   AROM forearm rotation x10 X 10   PROM wrist flex/ext 3 x 20 second hold    Juxtaciser  X 20   Towel Scrunches   X 10   PROM wrist flexion and extension therapist facilitated   X 10 with 5 second holds each way   PROM MP flexion and extension  X 10 with 5 second holds each way             Modalities     Heat 10-15 min 5 min          Therapist supervised patient with use of modalities during today's treatment session  Skin integrity was assessed and appeared normal following use of modalities  Assessment:   Pt is continuing to benefit from OT treatment services  Pt continues to exhibit redness and swelling at volar wrist crease  Follow up with orthopedics next week to discuss  Improved ROM after PROM of wrist and MP joints  Did not progress to strengthening at this time  Will continue to progress pt as tolerated to strengthening in two more weeks         Plan:   Focus on ROM and strengthening to improve ability to complete daily activites with ease    POC 12/27-3/7

## 2023-01-09 ENCOUNTER — OFFICE VISIT (OUTPATIENT)
Dept: OCCUPATIONAL THERAPY | Facility: MEDICAL CENTER | Age: 28
End: 2023-01-09

## 2023-01-09 DIAGNOSIS — S62.185D: ICD-10-CM

## 2023-01-09 DIAGNOSIS — S62.309D MULTIPLE CLOSED FRACTURES OF SINGLE METACARPAL BONE WITH ROUTINE HEALING, SUBSEQUENT ENCOUNTER: ICD-10-CM

## 2023-01-09 DIAGNOSIS — S62.145D CLOSED NONDISPLACED FRACTURE OF BODY OF HAMATE OF LEFT WRIST WITH ROUTINE HEALING, SUBSEQUENT ENCOUNTER: Primary | ICD-10-CM

## 2023-01-09 NOTE — PROGRESS NOTES
Daily Note     Today's date: 2023  Patient name: Debora Gill  : 1995  MRN: [id number]  Referring provider: Kathryn Matta MD  Dx:   Encounter Diagnosis     ICD-10-CM    1  Closed nondisplaced fracture of body of hamate of left wrist with routine healing, subsequent encounter  S62 145D       2  Multiple closed fractures of single metacarpal bone with routine healing, subsequent encounter  S62 309D       3  Closed nondisplaced fracture of trapezoid of left wrist with routine healing, subsequent encounter  S62 185D                      Subjective: "This is still painful" (volar wrist crease)    Objective: See treatment diary below     Precautions: Evaluate and Treat 2-3 times a week for 4-6 weeks ROM as tolerated can start strengthening in 2 weeks    Manuals HEP    IASTM of dorsal and volar surface of wrist/forearm  Dorsal surface of hand  Time: 10 minutes                  Ther Ex     Education on HEP and dx x5 min     AROM tendon glides x10 X 10   AROM table top extension x10 X 10   AROM digit add/abduction x10 X 10   AROM wrist flex/ext/RD/UD x10 X 10   AROM forearm rotation x10 X 10   PROM wrist flex/ext 3 x 20 second hold    Juxtaciser  X 20   Towel squeezes  X 10   Towel Scrunches   X 10   PROM wrist flexion and extension therapist facilitated   X 10 with 5 second holds each way   PROM MP flexion and extension  X 10 with 5 second holds each way             Modalities     Heat 10-15 min 5 min          Therapist supervised patient with use of modalities during today's treatment session  Skin integrity was assessed and appeared normal following use of modalities  Assessment:   Pt is continuing to benefit from OT treatment services  Pt has f/u doctor apt tomorrow for assessments of continued deficits  Pt continues to have swelling and edema noted on pointer finger and middle finger of MP joints  Improved PROM into full fist after therapist facilitated wrist and digital ROM   Did not progress to strengthening at this time  Will continue to progress pt as tolerated to strengthening in one week  Plan:   Focus on ROM and strengthening to improve ability to complete daily activites with ease  POC 12/27-3/7    Nuria Jimenez MS, OTR/L, CSRS, ZOYA, DCAT is supervising [facility], OTS

## 2023-01-10 ENCOUNTER — APPOINTMENT (OUTPATIENT)
Dept: RADIOLOGY | Facility: AMBULARY SURGERY CENTER | Age: 28
End: 2023-01-10
Attending: STUDENT IN AN ORGANIZED HEALTH CARE EDUCATION/TRAINING PROGRAM

## 2023-01-10 ENCOUNTER — OFFICE VISIT (OUTPATIENT)
Dept: OBGYN CLINIC | Facility: CLINIC | Age: 28
End: 2023-01-10

## 2023-01-10 VITALS
HEIGHT: 70 IN | HEART RATE: 85 BPM | BODY MASS INDEX: 17.9 KG/M2 | WEIGHT: 125 LBS | DIASTOLIC BLOOD PRESSURE: 69 MMHG | SYSTOLIC BLOOD PRESSURE: 112 MMHG

## 2023-01-10 DIAGNOSIS — S62.309D MULTIPLE CLOSED FRACTURES OF SINGLE METACARPAL BONE WITH ROUTINE HEALING, SUBSEQUENT ENCOUNTER: ICD-10-CM

## 2023-01-10 DIAGNOSIS — M77.8 LEFT WRIST TENDONITIS: ICD-10-CM

## 2023-01-10 DIAGNOSIS — S62.309D MULTIPLE CLOSED FRACTURES OF SINGLE METACARPAL BONE WITH ROUTINE HEALING, SUBSEQUENT ENCOUNTER: Primary | ICD-10-CM

## 2023-01-10 RX ORDER — MELOXICAM 15 MG/1
15 TABLET ORAL DAILY
Qty: 30 TABLET | Refills: 1 | Status: SHIPPED | OUTPATIENT
Start: 2023-01-10

## 2023-01-11 ENCOUNTER — OFFICE VISIT (OUTPATIENT)
Dept: OCCUPATIONAL THERAPY | Facility: MEDICAL CENTER | Age: 28
End: 2023-01-11

## 2023-01-11 DIAGNOSIS — S62.185D: ICD-10-CM

## 2023-01-11 DIAGNOSIS — S62.145D CLOSED NONDISPLACED FRACTURE OF BODY OF HAMATE OF LEFT WRIST WITH ROUTINE HEALING, SUBSEQUENT ENCOUNTER: Primary | ICD-10-CM

## 2023-01-11 DIAGNOSIS — S62.309D MULTIPLE CLOSED FRACTURES OF SINGLE METACARPAL BONE WITH ROUTINE HEALING, SUBSEQUENT ENCOUNTER: ICD-10-CM

## 2023-01-11 NOTE — PROGRESS NOTES
ORTHOPAEDIC HAND, WRIST, AND ELBOW OFFICE  VISIT       ASSESSMENT/PLAN:      Diagnoses and all orders for this visit:    Multiple closed fractures of single metacarpal bone with routine healing, subsequent encounter  -     XR hand 3+ vw left; Future  -     meloxicam (Mobic) 15 mg tablet; Take 1 tablet (15 mg total) by mouth daily    Left wrist tendonitis    Reji Retaan is a 32y o  year old male with left 3rd-5th metacarpal base fractures, nondisplaced hamate fx and nondisplaced trapezoid fx, DOI 11/18/22  Images reviewed with patient  He has had significant improvement of symptoms from fractures from prior evaluation and has made significant improvements in range of movement  Regarding his volar symptoms, these seem primarily focused around his FCR tendon and may be secondary to tendonitis  Discussed that we will continue to monitor symptoms and hopefully will continue to improve  Follow Up:  8 weeks       To Do Next Visit:  Re-evaluation of current issue and X-rays of the  left  hand        Eder Vieira MD  Attending, Orthopaedic Surgery  Hand, Wrist, and Elbow Surgery  [facility]    ____________________________________________________________________________________________________________________________________________      CHIEF COMPLAINT:  Chief Complaint   Patient presents with   • Left Hand - Fracture, Follow-up       SUBJECTIVE:  Reji Retana is a 32y o  year old male with left 3rd-5th metacarpal base fractures, nondisplaced hamate fx and nondisplaced trapezoid fx, DOI 11/18/22  The patient states he is doing much better than prior appointment  He states he has been doing therapy and making progress  He does discuss that he has some volar wrist swelling and tenderness       I have personally reviewed all the relevant PMH, PSH, SH, FH, Medications and allergies      PAST MEDICAL HISTORY:  Past Medical History:   Diagnosis Date   • Hypospadias     as a baby PAST SURGICAL HISTORY:  Past Surgical History:   Procedure Laterality Date   • HERNIA REPAIR  1998   • OTHER SURGICAL HISTORY      coils placed by urology    • TONSILLECTOMY  1997       FAMILY HISTORY:  Family History   Problem Relation Age of Onset   • Cancer Mother    • Hyperlipidemia Father    • Diabetes Father    • Hyperlipidemia Maternal Grandmother    • Hyperlipidemia Maternal Grandfather    • Hyperlipidemia Paternal Grandmother    • Hyperlipidemia Paternal Grandfather    • Diabetes Paternal Grandfather        SOCIAL HISTORY:  Social History     Tobacco Use   • Smoking status: Every Day     Packs/day: 1 50     Types: Cigarettes   • Smokeless tobacco: Never   Vaping Use   • Vaping Use: Never used   Substance Use Topics   • Alcohol use: Yes     Comment: social   • Drug use: No       MEDICATIONS:    Current Outpatient Medications:   •  meloxicam (Mobic) 15 mg tablet, Take 1 tablet (15 mg total) by mouth daily, Disp: 30 tablet, Rfl: 1  •  cephalexin (KEFLEX) 500 mg capsule, TAKE 1 CAPSULE BY MOUTH EVERY 6 HOURS FOR 7 DAYS  (Patient not taking: Reported on 12/13/2022), Disp: , Rfl:   •  ibuprofen (MOTRIN) 600 mg tablet, Take by mouth every 6 (six) hours as needed for mild pain (Patient not taking: Reported on 7/6/2022), Disp: , Rfl:   •  methocarbamol (ROBAXIN) 500 mg tablet, Take 1 tablet (500 mg total) by mouth every 6 (six) hours as needed for muscle spasms (Patient not taking: Reported on 3/23/2022), Disp: 15 tablet, Rfl: 0  •  methylPREDNISolone 4 MG tablet therapy pack, Use as directed on package (Patient not taking: Reported on 4/21/2022), Disp: 1 each, Rfl: 0    ALLERGIES:  Allergies   Allergen Reactions   • Alc-Diphenhydramine-Fd&C Red #40 [Diphenhydramine Hcl] Fatigue   • Iodinated Contrast Media    • Penicillins Other (See Comments)     Pt could not remember at this time           REVIEW OF SYSTEMS:  Musculoskeletal:        As noted in HPI     All other systems reviewed and are negative  VITALS:  Vitals:    01/10/23 1414   BP: 112/69   Pulse: 85       LABS:  HgA1c:   Lab Results   Component Value Date    HGBA1C 5 3 03/02/2022     BMP:   Lab Results   Component Value Date    GLUCOSE 115 11/18/2022    CALCIUM 9 8 11/23/2022    K 4 0 11/23/2022    CO2 29 11/23/2022     11/23/2022    BUN 11 11/23/2022    CREATININE 0 87 11/23/2022       _____________________________________________________  PHYSICAL EXAMINATION:  General: well developed and well nourished, alert, oriented times 3 and appears comfortable  Psychiatric: Normal  HEENT: Normocephalic, Atraumatic Trachea Midline, No torticollis  Pulmonary: No audible wheezing or respiratory distress   Abdomen/GI: Non tender, non distended   Cardiovascular: No pitting edema, 2+ radial pulse   Skin: No masses, erythema, lacerations, fluctation, ulcerations  Neurovascular: Sensation Intact to the Median, Ulnar, Radial Nerve, Motor Intact to the Median, Ulnar, Radial Nerve and Pulses Intact  Musculoskeletal: Normal, except as noted in detailed exam and in HPI  MUSCULOSKELETAL EXAMINATION:  Left hand:  Able to make a composite fist  Able to fully extend digits  No tenderness over carpal and metacarpal bones  Volar wrist swelling, focal primarily over FCR tendon  Tenderness over FCR tendon  Pain with resisted wrist flexion    ___________________________________________________  STUDIES REVIEWED:  Images of the left hand performed in the office today were reviewed in PACS by Dr Roberto Hester and demonstrate fifth metacarpal fracture with continued healing   Other fractures were only previously noted on prior CT    PROCEDURES PERFORMED:  Procedures  No Procedures performed today    _____________________________________________________      Noy Limb    I,:   am acting as a scribe while in the presence of the attending physician :       I,:   personally performed the services described in this documentation    as scribed in my presence :

## 2023-01-11 NOTE — PROGRESS NOTES
Daily Note     Today's date: 2023  Patient name: Karen Fortune  : 1995  MRN: [id number]  Referring provider: Mateusz French MD  Dx:   Encounter Diagnosis     ICD-10-CM    1  Closed nondisplaced fracture of body of hamate of left wrist with routine healing, subsequent encounter  S62 145D       2  Multiple closed fractures of single metacarpal bone with routine healing, subsequent encounter  S62 309D       3  Closed nondisplaced fracture of trapezoid of left wrist with routine healing, subsequent encounter  S62 185D                      Subjective: "The doctor said that he thinks the red spot is just inflammation of the tendon  He prescribed extra strength ibuprofen"    Objective: See treatment diary below     Precautions: Evaluate and Treat 2-3 times a week for 4-6 weeks ROM as tolerated can start strengthening in 2 weeks    Manuals HEP    IASTM Dorsal and volar surface of wrist/forearm  Dorsal surface of hand  Time: 10 minutes                  Ther Ex     Education on HEP and dx x5 min     AROM tendon glides x10 X 10   AROM table top extension x10 X 10   AROM digit add/abduction x10 X 10   AROM wrist flex/ext/RD/UD x10 X 10   AROM forearm rotation x10 X 10   PROM wrist flex/ext 3 x 20 second hold    Juxtaciser  X 20   Towel squeezes  X 10   Towel Scrunches   X 10   PROM wrist flexion and extension therapist facilitated   X 10 with 5 second holds each way   PROM MP flexion and extension  X 10 with 5 second holds each way   Abduction/Adduction of Yellow Sponges  X 20        Modalities     Heat 10-15 min 5 min          Therapist supervised patient with use of modalities during today's treatment session  Skin integrity was assessed and appeared normal following use of modalities  Assessment:   Pt is continuing to benefit from OT treatment services  Pt continues to have tightness and mild discomfort in L hand and wrist that is most prominent in left index and middle finger   Pt also notes new discomfort through web space, no pain with radial or palmar abduction  Will continue to progress pt as tolerated to strengthening next treatment session  Plan:   Focus on ROM and strengthening to improve ability to complete daily activites with ease  POC 12/27-3/7    Celio Brand MS, OTR/L, CSRS, ZOYA, DCAT is supervising [facility], OTS

## 2023-01-16 ENCOUNTER — OFFICE VISIT (OUTPATIENT)
Dept: OCCUPATIONAL THERAPY | Facility: MEDICAL CENTER | Age: 28
End: 2023-01-16

## 2023-01-16 DIAGNOSIS — S62.185D: ICD-10-CM

## 2023-01-16 DIAGNOSIS — S62.145D CLOSED NONDISPLACED FRACTURE OF BODY OF HAMATE OF LEFT WRIST WITH ROUTINE HEALING, SUBSEQUENT ENCOUNTER: ICD-10-CM

## 2023-01-16 DIAGNOSIS — S62.309D MULTIPLE CLOSED FRACTURES OF SINGLE METACARPAL BONE WITH ROUTINE HEALING, SUBSEQUENT ENCOUNTER: Primary | ICD-10-CM

## 2023-01-16 NOTE — PROGRESS NOTES
Daily Note     Today's date: 2023  Patient name: Cindy Turner  : 1995  MRN: [id number]  Referring provider: Rola Chappell MD  Dx:   Encounter Diagnosis     ICD-10-CM    1  Multiple closed fractures of single metacarpal bone with routine healing, subsequent encounter  S62 309D       2  Closed nondisplaced fracture of body of hamate of left wrist with routine healing, subsequent encounter  S62 145D       3  Closed nondisplaced fracture of trapezoid of left wrist with routine healing, subsequent encounter  S62 185D           Start Time: 845  Stop Time: 925  Total time in clinic (min): 40 minutes    Subjective: "Left hand feels stiff and sore since I've been trying to use it at home "    Objective: See treatment diary below     Precautions: Evaluate and Treat 2-3 times a week for 4-6 weeks ROM as tolerated can start strengthening in 2 weeks    Manuals HEP    IASTM Dorsal and volar surface of wrist/forearm  Dorsal surface of hand  Time: 10 minutes                  Ther Ex     Education on HEP and dx x5 min     AROM tendon glides x10 X 10   AROM table top extension x10 X 10   AROM digit add/abduction x10 X 10   AROM wrist flex/ext/RD/UD x10 X 10   AROM forearm rotation x10 X 10   PROM wrist flex/ext 3 x 20 second hold    Juxtaciser  X 20   Towel squeezes     Towel Scrunches   X 10   PROM wrist flexion and extension therapist facilitated   X 10 with 5 second holds each way   PROM MP flexion and extension  X 10 with 5 second holds each way   Abduction/Adduction of Yellow Sponges     PREs pronation, supination (#1) flexion, extension, radial deviation (#2)  2 x 10   Yellow Theraputty  HEP x 10 Time: 5 minutes        Modalities     Heat 10-15 min 5 min          Therapist supervised patient with use of modalities during today's treatment session  Skin integrity was assessed and appeared normal following use of modalities  Assessment:   Pt is continuing to benefit from OT treatment services   Pt reports current pain 1/10 with no activity and 5/10 with activity and stretching/PROM  Pt continues to have tightness in L left index and middle finger limiting from full flexion into fist  Pt reported pins and needles in L wrist with extension and flexion strengthening exercises and required 30 seconds breaks in between sets  Pt education on light strengthening exercises and theraputty HEP  Will continue to progress pt as tolerated to strengthening up to #3 next treatment session  Plan:   Pt to perform HEP as directed  OT sessions to focus on ROM and strengthening to improve ability to complete daily activites with ease  POC 12/27-3/7    Treatment conducted by student supervised and directed by Bridger Groves, MS, OTR/L, CSRS, ZOYA, DCAT  I agree with all treatment methods performed during this session by Eileen Schwarz, LIZ  Student was directly supervised by me during the entirety of session  Patient consent given to be treated by student

## 2023-01-18 ENCOUNTER — OFFICE VISIT (OUTPATIENT)
Dept: OCCUPATIONAL THERAPY | Facility: MEDICAL CENTER | Age: 28
End: 2023-01-18

## 2023-01-18 DIAGNOSIS — S62.309D MULTIPLE CLOSED FRACTURES OF SINGLE METACARPAL BONE WITH ROUTINE HEALING, SUBSEQUENT ENCOUNTER: Primary | ICD-10-CM

## 2023-01-18 DIAGNOSIS — S62.145D CLOSED NONDISPLACED FRACTURE OF BODY OF HAMATE OF LEFT WRIST WITH ROUTINE HEALING, SUBSEQUENT ENCOUNTER: ICD-10-CM

## 2023-01-18 DIAGNOSIS — S62.185D: ICD-10-CM

## 2023-01-18 NOTE — PROGRESS NOTES
Daily Note     Today's date: 2023  Patient name: Muriel Poole  : 1995  MRN: [id number]  Referring provider: John Wade MD  Dx:   Encounter Diagnosis     ICD-10-CM    1  Multiple closed fractures of single metacarpal bone with routine healing, subsequent encounter  S62 309D       2  Closed nondisplaced fracture of body of hamate of left wrist with routine healing, subsequent encounter  S62 145D       3  Closed nondisplaced fracture of trapezoid of left wrist with routine healing, subsequent encounter  S62 185D           Start Time: 845  Stop Time: 925  Total time in clinic (min): 40 minutes    Subjective: "I have been using the putty at home "    Objective: See treatment diary below     Precautions: Evaluate and Treat 2-3 times a week for 4-6 weeks ROM as tolerated can start strengthening in 2 weeks    Manuals HEP    IASTM Dorsal and volar surface of wrist/forearm  Dorsal surface of hand  Time: 10 minutes                  Ther Ex     Education on HEP and dx x5 min     AROM tendon glides x10 X 10   AROM table top extension x10 X 10   AROM digit add/abduction x10 X 10   AROM wrist flex/ext/RD/UD x10 X 10   AROM forearm rotation x10 X 10   PROM wrist flex/ext 3 x 20 second hold    Juxtaciser  X 20   Towel squeezes     Towel Scrunches   X 10   PROM wrist flexion and extension therapist facilitated   X 10 with 5 second holds each way   PROM MP flexion and extension  X 10 with 5 second holds each way   Abduction/Adduction of Yellow Sponges     PREs pronation, supination (#2) flexion, extension, radial deviation (#3)  2 x 20   Yellow Theraputty  HEP x 10    Powerweb with green clips   Clockwise x 1    Handgripper #4  X 10         Modalities     Heat 10-15 min 5 min          Therapist supervised patient with use of modalities during today's treatment session  Skin integrity was assessed and appeared normal following use of modalities      Assessment:   Pt is continuing to benefit from OT treatment services  Pt continues to have improved AROM and strength since previous visit  Current 1/10  Continuing to have difficulty with full flexion into fist without pain  Pt reports increased pain with pronation using 2#, modified discs to downgrade  Reports some fatigue with Powerweb and green clips  Will continue to progress pt as tolerated to strengthening up to #4 next treatment session  Plan:   Pt to perform HEP as directed  OT sessions to focus on ROM and strengthening to improve ability to complete daily activites with ease  POC 12/27-3/7    Treatment conducted by student supervised and directed by Temo Grijalva MS, OTR/L, CSRS, ZOYA, DCAT  I agree with all treatment methods performed during this session by LIZ Montaño  Student was directly supervised by me during the entirety of session  Patient consent given to be treated by student

## 2023-01-18 NOTE — PROGRESS NOTES
Daily Note     Today's date: 2023  Patient name: Bernard Johnson  : 1995  MRN: [id number]  Referring provider: Marla Groves MD  Dx:   Encounter Diagnosis     ICD-10-CM    1  Multiple closed fractures of single metacarpal bone with routine healing, subsequent encounter  S62 309D       2  Closed nondisplaced fracture of body of hamate of left wrist with routine healing, subsequent encounter  S62 145D       3  Closed nondisplaced fracture of trapezoid of left wrist with routine healing, subsequent encounter  S62 185D                      Subjective: "Left hand feels stiff and sore since I've been trying to use it at home "    Objective: See treatment diary below     Precautions: Evaluate and Treat 2-3 times a week for 4-6 weeks ROM as tolerated can start strengthening in 2 weeks    Manuals HEP    IASTM Dorsal and volar surface of wrist/forearm  Dorsal surface of hand  Time: 10 minutes                  Ther Ex     Education on HEP and dx x5 min     AROM tendon glides x10 X 10   AROM table top extension x10 X 10   AROM digit add/abduction x10 X 10   AROM wrist flex/ext/RD/UD x10 X 10   AROM forearm rotation x10 X 10   PROM wrist flex/ext 3 x 20 second hold    Juxtaciser  X 20   Towel squeezes     Towel Scrunches   X 10   PROM wrist flexion and extension therapist facilitated   X 10 with 5 second holds each way   PROM MP flexion and extension  X 10 with 5 second holds each way   Abduction/Adduction of Yellow Sponges     PREs pronation, supination (#1) flexion, extension, radial deviation (#2)  2 x 10   Yellow Theraputty  HEP x 10 Time: 5 minutes        Modalities     Heat 10-15 min 5 min          Therapist supervised patient with use of modalities during today's treatment session  Skin integrity was assessed and appeared normal following use of modalities  Assessment:   Pt is continuing to benefit from OT treatment services   Pt reports current pain 1/10 with no activity and 5/10 with activity and stretching/PROM  Pt continues to have tightness in L left index and middle finger limiting from full flexion into fist  Pt reported pins and needles in L wrist with extension and flexion strengthening exercises and required 30 seconds breaks in between sets  Pt education on light strengthening exercises and theraputty HEP  Will continue to progress pt as tolerated to strengthening up to #3 next treatment session  Plan:   Pt to perform HEP as directed  OT sessions to focus on ROM and strengthening to improve ability to complete daily activites with ease  POC 12/27-3/7    Treatment conducted by student supervised and directed by Doreen Deng MS, OTR/L, CSRS, ZOYA, DCAT  I agree with all treatment methods performed during this session by LIZ Vega  Student was directly supervised by me during the entirety of session  Patient consent given to be treated by student

## 2023-01-23 ENCOUNTER — APPOINTMENT (OUTPATIENT)
Dept: OCCUPATIONAL THERAPY | Facility: MEDICAL CENTER | Age: 28
End: 2023-01-23

## 2023-01-25 ENCOUNTER — OFFICE VISIT (OUTPATIENT)
Dept: OCCUPATIONAL THERAPY | Facility: MEDICAL CENTER | Age: 28
End: 2023-01-25

## 2023-01-25 DIAGNOSIS — S62.145D CLOSED NONDISPLACED FRACTURE OF BODY OF HAMATE OF LEFT WRIST WITH ROUTINE HEALING, SUBSEQUENT ENCOUNTER: ICD-10-CM

## 2023-01-25 DIAGNOSIS — S62.185D: ICD-10-CM

## 2023-01-25 DIAGNOSIS — S62.309D MULTIPLE CLOSED FRACTURES OF SINGLE METACARPAL BONE WITH ROUTINE HEALING, SUBSEQUENT ENCOUNTER: Primary | ICD-10-CM

## 2023-01-25 NOTE — PROGRESS NOTES
Daily Note     Today's date: 2023  Patient name: Lennie Colby  : 1995  MRN: [id number]  Referring provider: Steven Joyner MD  Dx:   Encounter Diagnosis     ICD-10-CM    1  Multiple closed fractures of single metacarpal bone with routine healing, subsequent encounter  S62 309D       2  Closed nondisplaced fracture of body of hamate of left wrist with routine healing, subsequent encounter  S62 145D       3  Closed nondisplaced fracture of trapezoid of left wrist with routine healing, subsequent encounter  S62 185D           Start Time: 845  Stop Time: 930  Total time in clinic (min): 45 minutes    Subjective: "After last session my hand and arm were sore  I've been getting shooting pain up into my forearm "    Objective: See treatment diary below     Precautions: Evaluate and Treat 2-3 times a week for 4-6 weeks ROM as tolerated can start strengthening in 2 weeks    Manuals HEP    IASTM Dorsal and volar surface of wrist/forearm  Dorsal surface of hand  Time: 10 minutes                  Ther Ex     Education on HEP and dx x5 min     AROM tendon glides x10 X 10   AROM table top extension x10 X 10   AROM digit add/abduction x10 X 10   AROM wrist flex/ext/RD/UD x10 X 10   AROM forearm rotation x10 X 10   PROM wrist flex/ext 3 x 20 second hold X 10   Juxtaciser  X 20   Towel squeezes     Towel Scrunches   X 10   PROM wrist flexion and extension therapist facilitated   X 10 with 5 second holds each way   PROM MP flexion and extension  X 10 with 5 second holds each way   Abduction/Adduction of Blue Sponges  X 10 with 5 second holds   PREs pronation, supination (#2) flexion, extension, radial deviation (#3)  2 x 20   Yellow Theraputty  HEP x 10    Powerweb with blue clips   Clockwise x 1    Handgripper #4     Green Digit Extender  X 10     Modalities     Heat 10-15 min 5 min          Therapist supervised patient with use of modalities during today's treatment session   Skin integrity was assessed and appeared normal following use of modalities  Assessment: Pt is continuing to benefit from OT treatment services  Improved AROM in MPs index, middle, ring, and pinky fingers  Pt able to make a full fist without pain  Stiffness across dorsal hand and forearm  Pt demonstrated improved strength with pinch, abduction, and extension with progression to blue resistance clips, blue sponges, and green digit extender  Pt reports pain 3/10 in volar wrist when performing PREs with #3 weight and with overhead movement such as washing hair  Pt reports he is able to perform more activities at work and at home but still feels weak and unable to lift heavy objects  Pt education about increased PROM and forearm stretches with HEP  Will continue to progress pt as tolerated  Plan: Pt to perform HEP as directed  OT sessions to focus on ROM and strengthening to improve ability to complete daily activites with ease  POC 12/27-3/7    Treatment conducted by student supervised and directed by Kami Briggs MS, OTR/L, CSRS, ZOYA, DCAT  I agree with all treatment methods performed during this session by Denis Camejo, OTS  Student was directly supervised by me during the entirety of session  Patient consent given to be treated by student

## 2023-01-30 ENCOUNTER — OFFICE VISIT (OUTPATIENT)
Dept: OCCUPATIONAL THERAPY | Facility: MEDICAL CENTER | Age: 28
End: 2023-01-30

## 2023-01-30 DIAGNOSIS — S62.185D: ICD-10-CM

## 2023-01-30 DIAGNOSIS — S62.145D CLOSED NONDISPLACED FRACTURE OF BODY OF HAMATE OF LEFT WRIST WITH ROUTINE HEALING, SUBSEQUENT ENCOUNTER: ICD-10-CM

## 2023-01-30 DIAGNOSIS — S62.309D MULTIPLE CLOSED FRACTURES OF SINGLE METACARPAL BONE WITH ROUTINE HEALING, SUBSEQUENT ENCOUNTER: Primary | ICD-10-CM

## 2023-01-30 NOTE — PROGRESS NOTES
Daily Note     Today's date: 2023  Patient name: Bernard Johnson  : 1995  MRN: [id number]  Referring provider: Marla Groves MD  Dx:   Encounter Diagnosis     ICD-10-CM    1  Multiple closed fractures of single metacarpal bone with routine healing, subsequent encounter  S62 309D       2  Closed nondisplaced fracture of body of hamate of left wrist with routine healing, subsequent encounter  S62 145D       3  Closed nondisplaced fracture of trapezoid of left wrist with routine healing, subsequent encounter  S62 185D           Start Time: 1017  Stop Time: 1100  Total time in clinic (min): 43 minutes    Subjective: "I had a lot of shooting pain in my forearm this weekend "    Objective: See treatment diary below     Precautions: Evaluate and Treat 2-3 times a week for 4-6 weeks ROM as tolerated can start strengthening in 2 weeks    Manuals HEP    IASTM Dorsal and volar surface of wrist/forearm  Dorsal surface of hand  Time: 10 minutes                  Ther Ex     Education on HEP and dx x5 min     AROM tendon glides x10 X 10   AROM table top extension x10 X 10   AROM digit add/abduction x10 X 10   AROM wrist flex/ext/RD/UD x10 X 10   AROM forearm rotation x10 X 10   PROM wrist flex/ext 3 x 20 second hold X 10   Juxtaciser  X 20   Towel squeezes     Towel Scrunches   X 10   PROM wrist flexion and extension therapist facilitated   X 10 with 5 second holds each way   PROM MP flexion and extension  X 10 with 5 second holds each way   Abduction/Adduction of Blue Sponges     PREs pronation, supination (#2) flexion, extension, radial deviation (#3)  2 x 20   Yellow Theraputty  HEP x 10    Powerweb with blue clips   Clockwise x 1    Handgripper #4     Green Flexbar   X 15 with 3 second hold         Green Digit Extender  X 10     Modalities     Heat 10-15 min 5 min          Therapist supervised patient with use of modalities during today's treatment session   Skin integrity was assessed and appeared normal following use of modalities  Assessment: Pt is continuing to benefit from OT treatment services  Pt continues to have improved AROM/PROM of MPs, index, middle, and ring finger than previous sessions  However, pt reports shooting pain in L wrist and forearm with activity  Stiffness noted across dorsal forearm  IASTM applied to L wrist and forearm to reduce tightness  Pt continues to progress with strengthening  Upgraded to green flexbar from red for sustained  and supination  Will continue to progress pt as tolerated  Plan: Pt to perform HEP as directed  OT sessions to focus on ROM and strengthening to improve ability to complete daily activites with ease  POC 12/27-3/7    Treatment conducted by student supervised and directed by Rajinder Yu MS, OTR/L, CSRS, ZOYA, DCAT  I agree with all treatment methods performed during this session by LIZ Ponce  Student was directly supervised by me during the entirety of session  Patient consent given to be treated by student

## 2023-02-02 ENCOUNTER — APPOINTMENT (OUTPATIENT)
Dept: OCCUPATIONAL THERAPY | Facility: MEDICAL CENTER | Age: 28
End: 2023-02-02

## 2023-02-04 DIAGNOSIS — S62.309D MULTIPLE CLOSED FRACTURES OF SINGLE METACARPAL BONE WITH ROUTINE HEALING, SUBSEQUENT ENCOUNTER: ICD-10-CM

## 2023-02-04 RX ORDER — MELOXICAM 15 MG/1
TABLET ORAL
Qty: 30 TABLET | Refills: 1 | Status: SHIPPED | OUTPATIENT
Start: 2023-02-04

## 2023-02-07 ENCOUNTER — OFFICE VISIT (OUTPATIENT)
Dept: OCCUPATIONAL THERAPY | Facility: MEDICAL CENTER | Age: 28
End: 2023-02-07

## 2023-02-07 DIAGNOSIS — S62.309D MULTIPLE CLOSED FRACTURES OF SINGLE METACARPAL BONE WITH ROUTINE HEALING, SUBSEQUENT ENCOUNTER: Primary | ICD-10-CM

## 2023-02-07 DIAGNOSIS — S62.185D: ICD-10-CM

## 2023-02-07 DIAGNOSIS — S62.145D CLOSED NONDISPLACED FRACTURE OF BODY OF HAMATE OF LEFT WRIST WITH ROUTINE HEALING, SUBSEQUENT ENCOUNTER: ICD-10-CM

## 2023-02-07 NOTE — PROGRESS NOTES
Daily Note     Today's date: 2023  Patient name: Vitor Guillen  : 1995  MRN: [id number]  Referring provider: Maycol Mitchell MD  Dx:   Encounter Diagnosis     ICD-10-CM    1  Multiple closed fractures of single metacarpal bone with routine healing, subsequent encounter  S62 309D       2  Closed nondisplaced fracture of body of hamate of left wrist with routine healing, subsequent encounter  S62 145D       3  Closed nondisplaced fracture of trapezoid of left wrist with routine healing, subsequent encounter  S62 185D           Start Time: 930  Stop Time:   Total time in clinic (min): 42 minutes    Subjective: "My L index finger feels like it needs to pop "    Objective: See treatment diary below     Precautions: Evaluate and Treat 2-3 times a week for 4-6 weeks ROM as tolerated can start strengthening in 2 weeks    Manuals HEP    IASTM Dorsal and volar surface of wrist/forearm  Dorsal surface of hand  Time: 10 minutes                  Ther Ex     Education on HEP and dx X 5 min     AROM tendon glides x10    AROM table top extension x10 X 10   AROM digit add/abduction x10 X 10   AROM wrist flex/ext/RD/UD x10 X 10   AROM forearm rotation x10 X 10   PROM wrist flex/ext 3 x 20 second hold X 10   Juxtaciser  X 20   Towel squeezes     Towel Scrunches   X 10   PROM wrist flexion and extension therapist facilitated   X 10 with 5 second holds each way   PROM MP flexion and extension  X 10 with 5 second holds each way   Abduction/Adduction of Blue Sponges     PREs pronation, supination (#3) flexion, extension, radial deviation (#3)  2 x 20   Pink Theraputty  HEP x 10    Powerweb with blue clips   Clockwise x 1    Handgripper #4     Green Flexbar supination, /pronation   X 20 with 3 second hold    Red Flex Bar - retrograde, prograde   X 20   Green Digit Extender  X 10     Modalities     Heat 10-15 min 5 min          Therapist supervised patient with use of modalities during today's treatment session   Skin integrity was assessed and appeared normal following use of modalities  Assessment: Pt is continuing to benefit from OT treatment services  Pt reports continued pain in L wrist and mild tingling through thumb and index finger with ADL/IADL activities  Continued tightness across volar wrist and dorsal forearm noted  Pt continues to progress with strengthening exercises  Increased supination and pronation to 3 lb and dispensed pink theraputty to add to HEP  Pt demonstrated fatigue towards end of session with PREs using 3 lb weight indicating reduced endurance  Pt education on correct stretching protocol to reduce wrist and forearm tightness  Will continue to progress pt as tolerated  Plan: Pt to perform HEP as directed  OT sessions to focus on ROM and strengthening to improve ability to complete daily activites with ease  POC 12/27-3/7    Treatment conducted by student supervised and directed by Temo Grijalva MS, OTR/L, CSRS, ZOYA, DCAT  I agree with all treatment methods performed during this session by LIZ Montaño  Student was directly supervised by me during the entirety of session  Patient consent given to be treated by student

## 2023-02-09 ENCOUNTER — EVALUATION (OUTPATIENT)
Dept: OCCUPATIONAL THERAPY | Facility: MEDICAL CENTER | Age: 28
End: 2023-02-09

## 2023-02-09 DIAGNOSIS — S62.185D: ICD-10-CM

## 2023-02-09 DIAGNOSIS — S62.145D CLOSED NONDISPLACED FRACTURE OF BODY OF HAMATE OF LEFT WRIST WITH ROUTINE HEALING, SUBSEQUENT ENCOUNTER: ICD-10-CM

## 2023-02-09 DIAGNOSIS — S62.309D MULTIPLE CLOSED FRACTURES OF SINGLE METACARPAL BONE WITH ROUTINE HEALING, SUBSEQUENT ENCOUNTER: Primary | ICD-10-CM
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
## 2023-02-13 ENCOUNTER — APPOINTMENT (OUTPATIENT)
Dept: OCCUPATIONAL THERAPY | Facility: MEDICAL CENTER | Age: 28
End: 2023-02-13

## 2023-02-15 ENCOUNTER — OFFICE VISIT (OUTPATIENT)
Dept: OCCUPATIONAL THERAPY | Facility: MEDICAL CENTER | Age: 28
End: 2023-02-15

## 2023-02-15 DIAGNOSIS — S62.185D: ICD-10-CM

## 2023-02-15 DIAGNOSIS — S62.145D CLOSED NONDISPLACED FRACTURE OF BODY OF HAMATE OF LEFT WRIST WITH ROUTINE HEALING, SUBSEQUENT ENCOUNTER: ICD-10-CM

## 2023-02-15 DIAGNOSIS — S62.309D MULTIPLE CLOSED FRACTURES OF SINGLE METACARPAL BONE WITH ROUTINE HEALING, SUBSEQUENT ENCOUNTER: Primary | ICD-10-CM

## 2023-02-15 NOTE — PROGRESS NOTES
Daily Note     Today's date: 2/15/2023  Patient name: Bernardino Kemp  : 1995  MRN: [id number]  Referring provider: Danielle Burger MD  Dx:   Encounter Diagnosis     ICD-10-CM    1  Multiple closed fractures of single metacarpal bone with routine healing, subsequent encounter  S62 309D       2  Closed nondisplaced fracture of body of hamate of left wrist with routine healing, subsequent encounter  S62 145D       3  Closed nondisplaced fracture of trapezoid of left wrist with routine healing, subsequent encounter  S62 185D                      Subjective: "My index finger still feels stiff  I think I am doing better though "    Objective: See treatment diary below     Precautions: Evaluate and Treat 2-3 times a week for 4-6 weeks ROM as tolerated can start strengthening in 2 weeks    Manuals HEP    IASTM Dorsal and volar surface of wrist/forearm  Dorsal surface of hand  Time: 10 minutes                  Ther Ex     Education on HEP and dx X 5 min     AROM tendon glides x10    AROM table top extension x10 X 10   AROM digit add/abduction x10 X 10   AROM wrist flex/ext/RD/UD x10 X 10   AROM forearm rotation x10 X 10   PROM wrist flex/ext 3 x 20 second hold X 10   Juxtaciser  X 20   Towel squeezes     Towel Scrunches   X 10   PROM wrist flexion and extension therapist facilitated   X 10 with 5 second holds each way   PROM MP flexion and extension  X 10 with 5 second holds each way   Abduction/Adduction of Blue Sponges  X 20   PREs pronation, supination (#4) flexion, extension, radial deviation (#4)  2 x 10   Pink Theraputty  HEP x 10    Powerweb with blue clips      Handgripper #5  X 20   Green Flexbar supination, /pronation   X 20 with 3 second hold    [employer] - retrograde, prograde   X 20   Green Digit Extender  X 10     Modalities     Heat 10-15 min 5 min          Therapist supervised patient with use of modalities during today's treatment session   Skin integrity was assessed and appeared normal following use of modalities  Assessment: Pt is continuing to benefit from OT treatment services  Pt reports continued tightness through L index finger, but overall is noticing improvement  Able to progress pt with strengthening today: Green flexbar, #4 PREs, gripper Level 5 indicating good treatment progression  Will continue to progress pt as tolerated  Plan: Pt to perform HEP as directed  OT sessions to focus on ROM and strengthening to improve ability to complete daily activites with ease    POC 12/27-3/7

## 2023-02-21 ENCOUNTER — OFFICE VISIT (OUTPATIENT)
Dept: OCCUPATIONAL THERAPY | Facility: MEDICAL CENTER | Age: 28
End: 2023-02-21

## 2023-02-21 DIAGNOSIS — S62.309D MULTIPLE CLOSED FRACTURES OF SINGLE METACARPAL BONE WITH ROUTINE HEALING, SUBSEQUENT ENCOUNTER: Primary | ICD-10-CM

## 2023-02-21 DIAGNOSIS — S62.185D: ICD-10-CM

## 2023-02-21 DIAGNOSIS — S62.145D CLOSED NONDISPLACED FRACTURE OF BODY OF HAMATE OF LEFT WRIST WITH ROUTINE HEALING, SUBSEQUENT ENCOUNTER: ICD-10-CM

## 2023-02-21 NOTE — PROGRESS NOTES
Daily Note     Today's date: 2023  Patient name: Gus Duong  : 1995  MRN: [id number]  Referring provider: Erika Lakhani MD  Dx:   Encounter Diagnosis     ICD-10-CM    1  Multiple closed fractures of single metacarpal bone with routine healing, subsequent encounter  S62 309D       2  Closed nondisplaced fracture of body of hamate of left wrist with routine healing, subsequent encounter  S62 145D       3  Closed nondisplaced fracture of trapezoid of left wrist with routine healing, subsequent encounter  S62 185D           Start Time: 845  Stop Time: 920  Total time in clinic (min): 35 minutes    Subjective: "My finger feels better but then feels stiff after I use it "    Objective: See treatment diary below     Precautions: Evaluate and Treat 2-3 times a week for 4-6 weeks ROM as tolerated can start strengthening in 2 weeks    Manuals HEP    IASTM Dorsal and volar surface of wrist/forearm   Dorsal surface of hand  Time: 10 minutes                  Ther Ex     Education on HEP and dx X 5 min     AROM tendon glides x10    AROM table top extension x10 X 10   AROM digit add/abduction x10 X 10   AROM wrist flex/ext/RD/UD x10 X 10   AROM forearm rotation x10 X 10   PROM wrist flex/ext 3 x 20 second hold X 10   Juxtaciser  X 20   Towel squeezes     Towel Scrunches   X 10   PROM wrist flexion and extension therapist facilitated   X 10 with 5 second holds each way   PROM MP flexion and extension  X 10 with 5 second holds each way   Abduction/Adduction of Blue Sponges  X 20   PREs pronation, supination (#4) flexion, extension, radial deviation (#4)  2 x 10   Pink Theraputty  HEP x 10    Powerweb with blue clips   Clockwise x 1   Handgripper #4 w/ 2 lb weight  5 x 10 second hold    Blue Flexbar supination, pronation   X 20 with 3 second hold    Blue Flex Bar - Prograde [employer] - retrograde  X 20   Green Digit Extender  X 10  X 10   Modalities     Heat 10-15 min 5 min          Therapist supervised patient with use of modalities during today's treatment session  Skin integrity was assessed and appeared normal following use of modalities  Assessment: Pt is continuing to benefit from OT treatment services  Reports improved use of L hand but L index finger feel stiff after activity  Pt continues to have improved strength and ROM compared to previous visit  Upgraded to blue flex bar for supination, pronation, and prograde today  Unable to progress with retrograde movement due to L radial wrist pain indicating impaired wrist strength  Will continue to progress pt as tolerated  Plan: Pt to perform HEP as directed  OT sessions to focus on ROM and strengthening to improve ability to complete daily activites with ease  POC 12/27-3/7    Treatment conducted by student supervised and directed by Kaur Wilkes MS, OTR/L, CSRS, ZOYA, DCAT  I agree with all treatment methods performed during this session by LIZ Bernal  Student was directly supervised by me during the entirety of session  Patient consent given to be treated by student

## 2023-02-23 ENCOUNTER — OFFICE VISIT (OUTPATIENT)
Dept: OCCUPATIONAL THERAPY | Facility: MEDICAL CENTER | Age: 28
End: 2023-02-23

## 2023-02-23 DIAGNOSIS — S62.145D CLOSED NONDISPLACED FRACTURE OF BODY OF HAMATE OF LEFT WRIST WITH ROUTINE HEALING, SUBSEQUENT ENCOUNTER: ICD-10-CM

## 2023-02-23 DIAGNOSIS — S62.185D: ICD-10-CM

## 2023-02-23 DIAGNOSIS — S62.309D MULTIPLE CLOSED FRACTURES OF SINGLE METACARPAL BONE WITH ROUTINE HEALING, SUBSEQUENT ENCOUNTER: Primary | ICD-10-CM

## 2023-02-23 NOTE — PROGRESS NOTES
Daily Note     Today's date: 2023  Patient name: Gus Duong  : 1995  MRN: [id number]  Referring provider: Erika Lakhani MD  Dx:   Encounter Diagnosis     ICD-10-CM    1  Multiple closed fractures of single metacarpal bone with routine healing, subsequent encounter  S62 309D       2  Closed nondisplaced fracture of body of hamate of left wrist with routine healing, subsequent encounter  S62 145D       3  Closed nondisplaced fracture of trapezoid of left wrist with routine healing, subsequent encounter  S62 185D           Start Time: 0850  Stop Time: 930  Total time in clinic (min): 40 minutes    Subjective: "My hand is okay, It feels stiff at work "    Objective: See treatment diary below     Precautions: Evaluate and Treat 2-3 times a week for 4-6 weeks ROM as tolerated can start strengthening in 2 weeks    Manuals HEP    IASTM Dorsal and volar surface of wrist/forearm   Dorsal surface of hand  Time: 10 minutes                  Ther Ex     Education on HEP and dx X 5 min     AROM tendon glides x10    AROM table top extension x10 X 10   AROM digit add/abduction x10 X 10   AROM wrist flex/ext/RD/UD x10 X 10   AROM forearm rotation x10 X 10   PROM wrist flex/ext 3 x 20 second hold X 10   Juxtaciser     Towel squeezes     Towel Scrunches   X 10   PROM wrist flexion and extension therapist facilitated   X 10 with 5 second holds each way   PROM MP flexion and extension  X 10 with 5 second holds each way   Abduction/Adduction of Blue Sponges     PREs pronation, supination (#4) flexion, extension, radial deviation (#4)  2 x 10   Pink Theraputty  HEP x 10    Powerweb with blue clips   Clockwise x 1   Handgripper #4 w/ 2 lb weight  5 x 10 second hold    Blue Flexbar supination, pronation   X 20 with 3 second hold    Blue Flex Bar - Prograde [employer] - retrograde  X 20   Green Flex Bar Proprioception Exercises   X 20   Blue Flex Bar radial deviation, ulnar deviation, supination, pronation  X 20 Green Digit Extender  X 10  X 10   Modalities     Heat 10-15 min 5 min          Therapist supervised patient with use of modalities during today's treatment session  Skin integrity was assessed and appeared normal following use of modalities  Assessment: Pt is continuing to benefit from OT treatment services  Increased tightness through L dorsal and volar surface of wrist/forearm compared to previous session  Good progression with strengthening today with blue flex bar and no difficulty w/ blue resistance clips today indicating improved  and pinch strength  Exhibited improved AROM with supination and pronation today  Pain in L carpal tunnel area with hand gripper & 2# weight today, d/c task today  Pt education on continuing with stretching and strengthening for HEP  Pt has appointment with hand orthopedist Tuesday  Will wait for physician report to continue OT services  Plan: Pt to perform HEP as directed  OT sessions to focus on ROM and strengthening to improve ability to complete daily activites with ease  POC 12/27-3/7    Treatment conducted by student supervised and directed by Nely Lopez MS, OTR/L, CSRS, ZOYA, DCAT  I agree with all treatment methods performed during this session by LIZ Avila  Student was directly supervised by me during the entirety of session  Patient consent given to be treated by student

## 2023-03-02 ENCOUNTER — OFFICE VISIT (OUTPATIENT)
Dept: OCCUPATIONAL THERAPY | Facility: MEDICAL CENTER | Age: 28
End: 2023-03-02

## 2023-03-02 DIAGNOSIS — S62.185D: ICD-10-CM

## 2023-03-02 DIAGNOSIS — S62.309D MULTIPLE CLOSED FRACTURES OF SINGLE METACARPAL BONE WITH ROUTINE HEALING, SUBSEQUENT ENCOUNTER: Primary | ICD-10-CM

## 2023-03-02 DIAGNOSIS — S62.145D CLOSED NONDISPLACED FRACTURE OF BODY OF HAMATE OF LEFT WRIST WITH ROUTINE HEALING, SUBSEQUENT ENCOUNTER: ICD-10-CM

## 2023-03-02 NOTE — PROGRESS NOTES
Discharge Note     Today's date: 3/2/2023  Patient name: Giorgi Morrison  : 1995  MRN: [id number]  Referring provider: Sarah Day MD  Dx:   Encounter Diagnosis     ICD-10-CM    1  Multiple closed fractures of single metacarpal bone with routine healing, subsequent encounter  S62 309D       2  Closed nondisplaced fracture of body of hamate of left wrist with routine healing, subsequent encounter  S62 145D       3  Closed nondisplaced fracture of trapezoid of left wrist with routine healing, subsequent encounter  S62 185D           Start Time: 845  Stop Time: 930  Total time in clinic (min): 45 minutes    Subjective: "My hand is better, some stiffness but I am not having sharp pains and I can use my hand more "    Objective: See treatment diary below     Precautions: Evaluate and Treat 2-3 times a week for 4-6 weeks ROM as tolerated can start strengthening in 2 weeks    Manuals HEP    IASTM Dorsal and volar surface of wrist/forearm   Dorsal surface of hand  Time: 10 minutes                  Ther Ex     Education on HEP and dx X 5 min     AROM tendon glides x10    AROM table top extension x10 X 10   AROM digit add/abduction x10 X 10   AROM wrist flex/ext/RD/UD x10 X 10   AROM forearm rotation x10 X 10   PROM wrist flex/ext 3 x 20 second hold X 10   Juxtaciser     Towel squeezes     Towel Scrunches      PROM wrist flexion and extension therapist facilitated  X 10 with 5 second holds each way    PROM MP flexion and extension  X 10 with 5 second holds each way   Abduction/Adduction of Blue Sponges     PREs pronation, supination (#4) flexion, extension, radial deviation (#5)  2 x 10   Garner Theraputty  HEP x 10    Powerweb with 1 blue, 1 black clip   Clockwise x 1   Handgripper #4 w/ 2 lb weight  5 x 15 second hold    Blue Flexbar supination, pronation   X 10 with 3 second hold    Blue Flex Bar - Prograde [employer] - retrograde  X 20   Green Flex Bar Proprioception Exercises   X 20   [employer] radial deviation, ulnar deviation, supination, pronation  X 20    Green Digit Extender  X 10  X 10   Modalities     Heat 10-15 min 5 min          Therapist supervised patient with use of modalities during today's treatment session  Skin integrity was assessed and appeared normal following use of modalities  Assessment: Pt is continuing to benefit from OT treatment services  Pt reports improved functional use of L hand but tightness through L index finger after activity  IASTM through volar wrist and dorsal forearm for reduction of soft tissue tightness  Exhibited improved strength with progression to 5# weight with PREs, black resistance clip, and increased time with sustained grasp using gripper  Pt demonstrated good endurance throughout session today  Today is pt is last scheduled appointment  Pt expressing desire to d/c skilled OT at this time  Pt education on continuing with HEP for improved performance with everyday activities  Plan: Continue with HEP as directed for improved performance in daily activities  Will discharge pt at this time to HEP  Will call if any problems or increase in pain in L hand  POC 12/27-3/7    Treatment conducted by student supervised and directed by Debbie Almanza, MS, OTR/L, CSRS, ZOYA, DCAT  I agree with all treatment methods performed during this session by LIZ Bernstein  Student was directly supervised by me during the entirety of session  Patient consent given to be treated by student

## 2023-03-25 DIAGNOSIS — S62.309D MULTIPLE CLOSED FRACTURES OF SINGLE METACARPAL BONE WITH ROUTINE HEALING, SUBSEQUENT ENCOUNTER: ICD-10-CM

## 2023-03-25 RX ORDER — MELOXICAM 15 MG/1
TABLET ORAL
Qty: 30 TABLET | Refills: 1 | Status: SHIPPED | OUTPATIENT
Start: 2023-03-25

## 2023-06-09 DIAGNOSIS — S62.309D MULTIPLE CLOSED FRACTURES OF SINGLE METACARPAL BONE WITH ROUTINE HEALING, SUBSEQUENT ENCOUNTER: ICD-10-CM

## 2023-06-09 RX ORDER — MELOXICAM 15 MG/1
TABLET ORAL
Qty: 30 TABLET | Refills: 1 | Status: SHIPPED | OUTPATIENT
Start: 2023-06-09

## 2023-07-06 DIAGNOSIS — S62.309D MULTIPLE CLOSED FRACTURES OF SINGLE METACARPAL BONE WITH ROUTINE HEALING, SUBSEQUENT ENCOUNTER: ICD-10-CM

## 2023-07-06 RX ORDER — MELOXICAM 15 MG/1
TABLET ORAL
Qty: 30 TABLET | Refills: 1 | Status: SHIPPED | OUTPATIENT
Start: 2023-07-06 | End: 2023-07-12

## 2023-07-12 DIAGNOSIS — S62.309D MULTIPLE CLOSED FRACTURES OF SINGLE METACARPAL BONE WITH ROUTINE HEALING, SUBSEQUENT ENCOUNTER: ICD-10-CM

## 2023-07-12 RX ORDER — MELOXICAM 15 MG/1
TABLET ORAL
Qty: 30 TABLET | Refills: 1 | Status: SHIPPED | OUTPATIENT
Start: 2023-07-12

## 2023-08-25 DIAGNOSIS — S62.309D MULTIPLE CLOSED FRACTURES OF SINGLE METACARPAL BONE WITH ROUTINE HEALING, SUBSEQUENT ENCOUNTER: ICD-10-CM

## 2023-08-25 RX ORDER — MELOXICAM 15 MG/1
TABLET ORAL
Qty: 30 TABLET | Refills: 1 | Status: SHIPPED | OUTPATIENT
Start: 2023-08-25

## 2023-11-27 DIAGNOSIS — S62.309D MULTIPLE CLOSED FRACTURES OF SINGLE METACARPAL BONE WITH ROUTINE HEALING, SUBSEQUENT ENCOUNTER: Primary | ICD-10-CM

## 2023-11-27 RX ORDER — MELOXICAM 15 MG/1
TABLET ORAL
Qty: 30 TABLET | Refills: 0 | Status: SHIPPED | OUTPATIENT
Start: 2023-11-27

## 2023-11-29 NOTE — TELEPHONE ENCOUNTER
Caller: Patient's Mother    Doctor: Maggie Ferrari    Reason for call: Calling again to check status of previous message. Relayed that that the message was left but has not yet been addressed.     Call back#: [phone]

## 2023-11-29 NOTE — TELEPHONE ENCOUNTER
Caller: Patients mom Giovani Chapa    Doctor: Stella Reese    Reason for call: Patients mom is calling stating son is receiving messages about medication refills and he is unsure why because he is not on any medications and didn't request a refill. She states she's on his HIPAA paperwork scanned in 6/2 but I am unable to open it.     Call back#: [phone]

## 2024-06-07 ENCOUNTER — TELEPHONE (OUTPATIENT)
Dept: OBGYN CLINIC | Facility: HOSPITAL | Age: 29
End: 2024-06-07

## 2024-06-07 ENCOUNTER — OCCMED (OUTPATIENT)
Dept: URGENT CARE | Age: 29
End: 2024-06-07

## 2024-06-07 NOTE — TELEPHONE ENCOUNTER
The patient needs a note asap stating that he may being a new job on Marcos night.  He has a form that needs to be filled out.   Mom is asking if someone at the [place] office would be able to fill this out. Mom states he was released from Dr Michel 2 years ago. The PA from [facility] would not clear him.  Tried to call [facility] no answer at this time. Mom going to call [facility] and ask to speak to a supervisor. The patient needs to begin this job on Marcos night. Note 7/6/22 from Dr Michel stating Gilmar was released to full duty.

## 2024-06-07 NOTE — PROGRESS NOTES
[facility]        NAME: Cortez Deluca is a 28 y.o. male  : 1995    MRN: [id number]  DATE: 2024  TIME: 1:47 PM    There were no vitals taken for this visit.    Assessment and Plan   No primary diagnosis found.  No diagnosis found.      Patient Instructions       Follow up with PCP in 3-5 days.  Proceed to  ER if symptoms worsen.    Chief Complaint   No chief complaint on file.        History of Present Illness       HPI    Review of Systems   Review of Systems      Current Medications       Current Outpatient Medications:     cephalexin (KEFLEX) 500 mg capsule, TAKE 1 CAPSULE BY MOUTH EVERY 6 HOURS FOR 7 DAYS. (Patient not taking: Reported on 2022), Disp: , Rfl:     meloxicam (MOBIC) 15 mg tablet, TAKE ONE TABLET BY MOUTH DAILY, Disp: 30 tablet, Rfl: 0    methocarbamol (ROBAXIN) 500 mg tablet, Take 1 tablet (500 mg total) by mouth every 6 (six) hours as needed for muscle spasms (Patient not taking: Reported on 3/23/2022), Disp: 15 tablet, Rfl: 0    methylPREDNISolone 4 MG tablet therapy pack, Use as directed on package (Patient not taking: Reported on 2022), Disp: 1 each, Rfl: 0    Current Allergies     Allergies as of 2024 - Reviewed 01/10/2023   Allergen Reaction Noted    Alc-diphenhydramine-fd&c red #40 [diphenhydramine hcl] Fatigue 01/10/2023    Iodinated contrast media  2017    Penicillins Other (See Comments) 2022            The following portions of the patient's history were reviewed and updated as appropriate: allergies, current medications, past family history, past medical history, past social history, past surgical history and problem list.     Past Medical History:   Diagnosis Date    Hypospadias     as a baby       Past Surgical History:   Procedure Laterality Date    HERNIA REPAIR  1998    OTHER SURGICAL HISTORY      coils placed by urology     TONSILLECTOMY         Family History   Problem Relation Age of Onset    Cancer Mother      Hyperlipidemia Father     Diabetes Father     Hyperlipidemia Maternal Grandmother     Hyperlipidemia Maternal Grandfather     Hyperlipidemia Paternal Grandmother     Hyperlipidemia Paternal Grandfather     Diabetes Paternal Grandfather          Medications have been verified.        Objective   There were no vitals taken for this visit.       Physical Exam     Physical Exam

## 2024-06-10 NOTE — TELEPHONE ENCOUNTER
The patient needs a note asap stating that he may being a new job on Marcos night.  He has a form that needs to be filled out.   Mom is asking if someone at the [place] office would be able to fill this out. Mom states he was released from Dr Michel 2 years ago. The PA from [facility] would not clear him.  Tried to call [facility] no answer at this time. Mom going to call [facility] and ask to speak to a supervisor. The patient needs to begin this job on Marcos night. Note 7/6/22 from Dr Michel stating Gilmar was released to full duty.     Is there anyone that can help assist this patient, he cannot start the job until he is released, all he needs is a basic physical so he can start his job?

## 2024-10-15 ENCOUNTER — APPOINTMENT (OUTPATIENT)
Dept: URGENT CARE | Age: 29
End: 2024-10-15
Payer: OTHER MISCELLANEOUS

## 2024-10-15 ENCOUNTER — HOSPITAL ENCOUNTER (OUTPATIENT)
Dept: RADIOLOGY | Age: 29
Discharge: HOME | End: 2024-10-15
Payer: OTHER MISCELLANEOUS

## 2024-10-15 DIAGNOSIS — R10.30 INGUINAL PAIN, UNSPECIFIED LATERALITY: ICD-10-CM

## 2024-10-15 PROCEDURE — 76870 US EXAM SCROTUM: CPT

## 2024-10-15 PROCEDURE — G0383 LEV 4 HOSP TYPE B ED VISIT: HCPCS | Performed by: PHYSICIAN ASSISTANT

## 2024-10-15 PROCEDURE — 99284 EMERGENCY DEPT VISIT MOD MDM: CPT | Performed by: PHYSICIAN ASSISTANT

## 2024-10-16 ENCOUNTER — TELEPHONE (OUTPATIENT)
Age: 29
End: 2024-10-16

## 2024-10-16 NOTE — TELEPHONE ENCOUNTER
"Caller: feng Toro    Doctor: not spa    Reason for call: pt called stating he is returning \"Karen's\" call.  I don't see any notes regarding who may have called the pt.  If someone needs him please call again    Call back#: [phone]  "

## 2024-10-17 ENCOUNTER — APPOINTMENT (OUTPATIENT)
Dept: URGENT CARE | Age: 29
End: 2024-10-17

## 2024-10-17 PROCEDURE — 99213 OFFICE O/P EST LOW 20 MIN: CPT | Performed by: PHYSICIAN ASSISTANT

## 2024-10-23 ENCOUNTER — APPOINTMENT (OUTPATIENT)
Dept: URGENT CARE | Age: 29
End: 2024-10-23
Payer: OTHER MISCELLANEOUS

## 2024-10-23 PROCEDURE — 99213 OFFICE O/P EST LOW 20 MIN: CPT

## 2025-05-03 ENCOUNTER — HOSPITAL ENCOUNTER (OUTPATIENT)
Dept: RADIOLOGY | Facility: HOSPITAL | Age: 30
Discharge: HOME | End: 2025-05-03
Payer: COMMERCIAL

## 2025-05-03 ENCOUNTER — HOSPITAL ENCOUNTER (OUTPATIENT)
Dept: MRI IMAGING | Facility: HOSPITAL | Age: 30
Discharge: HOME | End: 2025-05-03
Attending: ORTHOPAEDIC SURGERY
Payer: COMMERCIAL

## 2025-05-03 DIAGNOSIS — M25.531 PAIN IN RIGHT WRIST: ICD-10-CM

## 2025-05-03 PROCEDURE — 73221 MRI JOINT UPR EXTREM W/O DYE: CPT

## 2025-06-26 ENCOUNTER — APPOINTMENT (OUTPATIENT)
Dept: RADIOLOGY | Age: 30
End: 2025-06-26
Attending: STUDENT IN AN ORGANIZED HEALTH CARE EDUCATION/TRAINING PROGRAM
Payer: COMMERCIAL

## 2025-06-26 ENCOUNTER — OCCMED (OUTPATIENT)
Dept: URGENT CARE | Age: 30
End: 2025-06-26
Payer: OTHER MISCELLANEOUS

## 2025-06-26 DIAGNOSIS — S69.91XA INJURY OF RIGHT HAND, INITIAL ENCOUNTER: ICD-10-CM

## 2025-06-26 DIAGNOSIS — S60.221A CONTUSION OF RIGHT HAND, INITIAL ENCOUNTER: Primary | ICD-10-CM

## 2025-06-26 PROCEDURE — G0382 LEV 3 HOSP TYPE B ED VISIT: HCPCS | Performed by: PHYSICIAN ASSISTANT

## 2025-06-26 PROCEDURE — 73130 X-RAY EXAM OF HAND: CPT

## 2025-06-26 PROCEDURE — 99283 EMERGENCY DEPT VISIT LOW MDM: CPT | Performed by: PHYSICIAN ASSISTANT

## 2025-07-03 ENCOUNTER — APPOINTMENT (OUTPATIENT)
Dept: URGENT CARE | Age: 30
End: 2025-07-03
Payer: OTHER MISCELLANEOUS

## 2025-07-03 PROCEDURE — 99213 OFFICE O/P EST LOW 20 MIN: CPT | Performed by: STUDENT IN AN ORGANIZED HEALTH CARE EDUCATION/TRAINING PROGRAM

## 2025-07-15 ENCOUNTER — APPOINTMENT (OUTPATIENT)
Dept: URGENT CARE | Age: 30
End: 2025-07-15
Payer: OTHER MISCELLANEOUS

## 2025-07-15 PROCEDURE — 99213 OFFICE O/P EST LOW 20 MIN: CPT | Performed by: STUDENT IN AN ORGANIZED HEALTH CARE EDUCATION/TRAINING PROGRAM
